# Patient Record
Sex: FEMALE | Race: WHITE | Employment: FULL TIME | URBAN - METROPOLITAN AREA
[De-identification: names, ages, dates, MRNs, and addresses within clinical notes are randomized per-mention and may not be internally consistent; named-entity substitution may affect disease eponyms.]

---

## 2017-02-08 LAB
HBSAG, EXTERNAL: NEGATIVE
RUBELLA, EXTERNAL: NORMAL
TYPE, ABO & RH, EXTERNAL: NORMAL

## 2017-07-12 LAB — T. PALLIDUM, EXTERNAL: NEGATIVE

## 2017-08-25 LAB — GRBS, EXTERNAL: NEGATIVE

## 2017-09-13 ENCOUNTER — ANESTHESIA EVENT (OUTPATIENT)
Dept: LABOR AND DELIVERY | Age: 32
End: 2017-09-13
Payer: COMMERCIAL

## 2017-09-13 ENCOUNTER — HOSPITAL ENCOUNTER (OUTPATIENT)
Dept: OTHER | Age: 32
Discharge: HOME | End: 2017-09-13
Payer: COMMERCIAL

## 2017-09-13 VITALS — WEIGHT: 144.1 LBS | BODY MASS INDEX: 24.6 KG/M2 | HEIGHT: 64 IN

## 2017-09-13 LAB
ABO + RH BLD: NORMAL
BLOOD GROUP ANTIBODIES SERPL: NORMAL
ERYTHROCYTE [DISTWIDTH] IN BLOOD BY AUTOMATED COUNT: 13 % (ref 11.5–14.5)
HCT VFR BLD AUTO: 35.8 % (ref 35–47)
HGB BLD-MCNC: 11.9 G/DL (ref 11.5–16)
MCH RBC QN AUTO: 29.8 PG (ref 26–34)
MCHC RBC AUTO-ENTMCNC: 33.2 G/DL (ref 30–36.5)
MCV RBC AUTO: 89.7 FL (ref 80–99)
PLATELET # BLD AUTO: 144 K/UL (ref 150–400)
RBC # BLD AUTO: 3.99 M/UL (ref 3.8–5.2)
SPECIMEN EXP DATE BLD: NORMAL
WBC # BLD AUTO: 6.4 K/UL (ref 3.6–11)

## 2017-09-13 PROCEDURE — 85027 COMPLETE CBC AUTOMATED: CPT | Performed by: OBSTETRICS & GYNECOLOGY

## 2017-09-13 PROCEDURE — 86900 BLOOD TYPING SEROLOGIC ABO: CPT | Performed by: OBSTETRICS & GYNECOLOGY

## 2017-09-13 PROCEDURE — 36415 COLL VENOUS BLD VENIPUNCTURE: CPT | Performed by: OBSTETRICS & GYNECOLOGY

## 2017-09-13 RX ORDER — GUAIFENESIN 100 MG/5ML
81 LIQUID (ML) ORAL DAILY
COMMUNITY
End: 2017-09-17

## 2017-09-13 NOTE — PROGRESS NOTES
Patient here for Pre-Admission Testing (PAT) for scheduled  section. PAT packet reviewed with the patient. Labs drawn and sent. CHRISTINE wipes and preventing surgical site infection education given to the patient. Education also provided to be NPO after 0400 and to arrive for scheduled procedure at 1000 on 2017. Patient verbalizes understanding and sent home with PAT packet for further review. Patient instructed to take no medication(s) on the morning of her scheduled procedure.

## 2017-09-14 ENCOUNTER — HOSPITAL ENCOUNTER (INPATIENT)
Age: 32
LOS: 3 days | Discharge: HOME | End: 2017-09-17
Attending: OBSTETRICS & GYNECOLOGY | Admitting: OBSTETRICS & GYNECOLOGY
Payer: COMMERCIAL

## 2017-09-14 ENCOUNTER — ANESTHESIA (OUTPATIENT)
Dept: LABOR AND DELIVERY | Age: 32
End: 2017-09-14
Payer: COMMERCIAL

## 2017-09-14 PROBLEM — Z34.90 PREGNANCY: Status: ACTIVE | Noted: 2017-09-14

## 2017-09-14 PROCEDURE — 77030034849

## 2017-09-14 PROCEDURE — 77030018836 HC SOL IRR NACL ICUM -A

## 2017-09-14 PROCEDURE — 74011250636 HC RX REV CODE- 250/636

## 2017-09-14 PROCEDURE — 75410000003 HC RECOV DEL/VAG/CSECN EA 0.5 HR: Performed by: OBSTETRICS & GYNECOLOGY

## 2017-09-14 PROCEDURE — 76010000392 HC C SECN EA ADDL 0.5 HR: Performed by: OBSTETRICS & GYNECOLOGY

## 2017-09-14 PROCEDURE — 77030011640 HC PAD GRND REM COVD -A

## 2017-09-14 PROCEDURE — 74011250636 HC RX REV CODE- 250/636: Performed by: ANESTHESIOLOGY

## 2017-09-14 PROCEDURE — 76060000078 HC EPIDURAL ANESTHESIA: Performed by: OBSTETRICS & GYNECOLOGY

## 2017-09-14 PROCEDURE — 65410000002 HC RM PRIVATE OB

## 2017-09-14 PROCEDURE — 74011250636 HC RX REV CODE- 250/636: Performed by: OBSTETRICS & GYNECOLOGY

## 2017-09-14 PROCEDURE — 76010000391 HC C SECN FIRST 1 HR: Performed by: OBSTETRICS & GYNECOLOGY

## 2017-09-14 PROCEDURE — 77030018846 HC SOL IRR STRL H20 ICUM -A

## 2017-09-14 PROCEDURE — 77030020061 HC IV BLD WRMR ADMIN SET 3M -B: Performed by: ANESTHESIOLOGY

## 2017-09-14 PROCEDURE — 74011000250 HC RX REV CODE- 250

## 2017-09-14 PROCEDURE — 77030011220 HC DEV ELECSURG COVD -B

## 2017-09-14 PROCEDURE — 77030032490 HC SLV COMPR SCD KNE COVD -B

## 2017-09-14 PROCEDURE — 4A1H74Z MONITORING OF PRODUCTS OF CONCEPTION, CARDIAC ELECTRICAL ACTIVITY, VIA NATURAL OR ARTIFICIAL OPENING: ICD-10-PCS | Performed by: OBSTETRICS & GYNECOLOGY

## 2017-09-14 PROCEDURE — 77030012890

## 2017-09-14 RX ORDER — MORPHINE SULFATE 10 MG/ML
10 INJECTION, SOLUTION INTRAMUSCULAR; INTRAVENOUS
Status: ACTIVE | OUTPATIENT
Start: 2017-09-14 | End: 2017-09-15

## 2017-09-14 RX ORDER — OXYCODONE AND ACETAMINOPHEN 5; 325 MG/1; MG/1
1 TABLET ORAL
Status: DISCONTINUED | OUTPATIENT
Start: 2017-09-14 | End: 2017-09-15

## 2017-09-14 RX ORDER — SODIUM CHLORIDE 0.9 % (FLUSH) 0.9 %
5-10 SYRINGE (ML) INJECTION EVERY 8 HOURS
Status: DISCONTINUED | OUTPATIENT
Start: 2017-09-14 | End: 2017-09-18 | Stop reason: HOSPADM

## 2017-09-14 RX ORDER — BUPIVACAINE HYDROCHLORIDE 7.5 MG/ML
INJECTION, SOLUTION EPIDURAL; RETROBULBAR AS NEEDED
Status: DISCONTINUED | OUTPATIENT
Start: 2017-09-14 | End: 2017-09-14 | Stop reason: HOSPADM

## 2017-09-14 RX ORDER — ONDANSETRON 2 MG/ML
4 INJECTION INTRAMUSCULAR; INTRAVENOUS
Status: ACTIVE | OUTPATIENT
Start: 2017-09-14 | End: 2017-09-15

## 2017-09-14 RX ORDER — NALOXONE HYDROCHLORIDE 0.4 MG/ML
0.4 INJECTION, SOLUTION INTRAMUSCULAR; INTRAVENOUS; SUBCUTANEOUS AS NEEDED
Status: DISCONTINUED | OUTPATIENT
Start: 2017-09-14 | End: 2017-09-18 | Stop reason: HOSPADM

## 2017-09-14 RX ORDER — PHENYLEPHRINE 10 MG/250 ML(40 MCG/ML)IN 0.9 % SOD.CHLORIDE INTRAVENOUS
Status: DISPENSED
Start: 2017-09-14 | End: 2017-09-14

## 2017-09-14 RX ORDER — IBUPROFEN 400 MG/1
800 TABLET ORAL EVERY 8 HOURS
Status: DISCONTINUED | OUTPATIENT
Start: 2017-09-14 | End: 2017-09-18 | Stop reason: HOSPADM

## 2017-09-14 RX ORDER — MIDAZOLAM HYDROCHLORIDE 1 MG/ML
INJECTION, SOLUTION INTRAMUSCULAR; INTRAVENOUS AS NEEDED
Status: DISCONTINUED | OUTPATIENT
Start: 2017-09-14 | End: 2017-09-14 | Stop reason: HOSPADM

## 2017-09-14 RX ORDER — OXYTOCIN/RINGER'S LACTATE 20/1000 ML
PLASTIC BAG, INJECTION (ML) INTRAVENOUS
Status: DISCONTINUED | OUTPATIENT
Start: 2017-09-14 | End: 2017-09-14 | Stop reason: HOSPADM

## 2017-09-14 RX ORDER — OXYTOCIN/RINGER'S LACTATE 20/1000 ML
PLASTIC BAG, INJECTION (ML) INTRAVENOUS
Status: DISPENSED
Start: 2017-09-14 | End: 2017-09-14

## 2017-09-14 RX ORDER — MORPHINE SULFATE 2 MG/ML
INJECTION, SOLUTION INTRAMUSCULAR; INTRAVENOUS AS NEEDED
Status: DISCONTINUED | OUTPATIENT
Start: 2017-09-14 | End: 2017-09-14 | Stop reason: HOSPADM

## 2017-09-14 RX ORDER — DIPHENHYDRAMINE HYDROCHLORIDE 50 MG/ML
12.5 INJECTION, SOLUTION INTRAMUSCULAR; INTRAVENOUS
Status: DISCONTINUED | OUTPATIENT
Start: 2017-09-14 | End: 2017-09-18 | Stop reason: HOSPADM

## 2017-09-14 RX ORDER — SODIUM CHLORIDE 0.9 % (FLUSH) 0.9 %
5-10 SYRINGE (ML) INJECTION AS NEEDED
Status: DISCONTINUED | OUTPATIENT
Start: 2017-09-14 | End: 2017-09-14 | Stop reason: HOSPADM

## 2017-09-14 RX ORDER — MORPHINE SULFATE 10 MG/ML
6 INJECTION, SOLUTION INTRAMUSCULAR; INTRAVENOUS
Status: ACTIVE | OUTPATIENT
Start: 2017-09-14 | End: 2017-09-15

## 2017-09-14 RX ORDER — SIMETHICONE 80 MG
80 TABLET,CHEWABLE ORAL AS NEEDED
Status: DISCONTINUED | OUTPATIENT
Start: 2017-09-14 | End: 2017-09-18 | Stop reason: HOSPADM

## 2017-09-14 RX ORDER — BISACODYL 5 MG
5 TABLET, DELAYED RELEASE (ENTERIC COATED) ORAL DAILY PRN
Status: DISCONTINUED | OUTPATIENT
Start: 2017-09-14 | End: 2017-09-18 | Stop reason: HOSPADM

## 2017-09-14 RX ORDER — SODIUM CHLORIDE, SODIUM LACTATE, POTASSIUM CHLORIDE, CALCIUM CHLORIDE 600; 310; 30; 20 MG/100ML; MG/100ML; MG/100ML; MG/100ML
1000 INJECTION, SOLUTION INTRAVENOUS CONTINUOUS
Status: DISCONTINUED | OUTPATIENT
Start: 2017-09-14 | End: 2017-09-14 | Stop reason: HOSPADM

## 2017-09-14 RX ORDER — SODIUM CHLORIDE, SODIUM LACTATE, POTASSIUM CHLORIDE, CALCIUM CHLORIDE 600; 310; 30; 20 MG/100ML; MG/100ML; MG/100ML; MG/100ML
125 INJECTION, SOLUTION INTRAVENOUS CONTINUOUS
Status: DISCONTINUED | OUTPATIENT
Start: 2017-09-14 | End: 2017-09-17

## 2017-09-14 RX ORDER — KETOROLAC TROMETHAMINE 30 MG/ML
30 INJECTION, SOLUTION INTRAMUSCULAR; INTRAVENOUS
Status: DISPENSED | OUTPATIENT
Start: 2017-09-14 | End: 2017-09-15

## 2017-09-14 RX ORDER — SODIUM CHLORIDE 0.9 % (FLUSH) 0.9 %
5-10 SYRINGE (ML) INJECTION EVERY 8 HOURS
Status: DISCONTINUED | OUTPATIENT
Start: 2017-09-14 | End: 2017-09-14 | Stop reason: HOSPADM

## 2017-09-14 RX ORDER — NALBUPHINE HYDROCHLORIDE 10 MG/ML
2.5 INJECTION, SOLUTION INTRAMUSCULAR; INTRAVENOUS; SUBCUTANEOUS
Status: ACTIVE | OUTPATIENT
Start: 2017-09-14 | End: 2017-09-15

## 2017-09-14 RX ORDER — SODIUM CHLORIDE 0.9 % (FLUSH) 0.9 %
5-10 SYRINGE (ML) INJECTION AS NEEDED
Status: DISCONTINUED | OUTPATIENT
Start: 2017-09-14 | End: 2017-09-18 | Stop reason: HOSPADM

## 2017-09-14 RX ORDER — NALOXONE HYDROCHLORIDE 0.4 MG/ML
0.4 INJECTION, SOLUTION INTRAMUSCULAR; INTRAVENOUS; SUBCUTANEOUS AS NEEDED
Status: CANCELLED | OUTPATIENT
Start: 2017-09-14

## 2017-09-14 RX ORDER — MORPHINE SULFATE 0.5 MG/ML
INJECTION, SOLUTION EPIDURAL; INTRATHECAL; INTRAVENOUS AS NEEDED
Status: DISCONTINUED | OUTPATIENT
Start: 2017-09-14 | End: 2017-09-14 | Stop reason: HOSPADM

## 2017-09-14 RX ORDER — GLYCOPYRROLATE 0.2 MG/ML
INJECTION INTRAMUSCULAR; INTRAVENOUS AS NEEDED
Status: DISCONTINUED | OUTPATIENT
Start: 2017-09-14 | End: 2017-09-14 | Stop reason: HOSPADM

## 2017-09-14 RX ORDER — CEFAZOLIN SODIUM IN 0.9 % NACL 2 G/50 ML
2 INTRAVENOUS SOLUTION, PIGGYBACK (ML) INTRAVENOUS ONCE
Status: COMPLETED | OUTPATIENT
Start: 2017-09-14 | End: 2017-09-14

## 2017-09-14 RX ORDER — ACETAMINOPHEN 10 MG/ML
INJECTION, SOLUTION INTRAVENOUS AS NEEDED
Status: DISCONTINUED | OUTPATIENT
Start: 2017-09-14 | End: 2017-09-14 | Stop reason: HOSPADM

## 2017-09-14 RX ORDER — ACETAMINOPHEN 325 MG/1
650 TABLET ORAL
Status: DISCONTINUED | OUTPATIENT
Start: 2017-09-14 | End: 2017-09-18 | Stop reason: HOSPADM

## 2017-09-14 RX ORDER — OXYTOCIN/RINGER'S LACTATE 20/1000 ML
125-500 PLASTIC BAG, INJECTION (ML) INTRAVENOUS ONCE
Status: ACTIVE | OUTPATIENT
Start: 2017-09-14 | End: 2017-09-15

## 2017-09-14 RX ADMIN — SODIUM CHLORIDE, SODIUM LACTATE, POTASSIUM CHLORIDE, AND CALCIUM CHLORIDE: 600; 310; 30; 20 INJECTION, SOLUTION INTRAVENOUS at 12:30

## 2017-09-14 RX ADMIN — BUPIVACAINE HYDROCHLORIDE 12 MG: 7.5 INJECTION, SOLUTION EPIDURAL; RETROBULBAR at 12:23

## 2017-09-14 RX ADMIN — ACETAMINOPHEN 1000 MG: 10 INJECTION, SOLUTION INTRAVENOUS at 13:37

## 2017-09-14 RX ADMIN — MIDAZOLAM HYDROCHLORIDE 1 MG: 1 INJECTION, SOLUTION INTRAMUSCULAR; INTRAVENOUS at 13:04

## 2017-09-14 RX ADMIN — MIDAZOLAM HYDROCHLORIDE 1 MG: 1 INJECTION, SOLUTION INTRAMUSCULAR; INTRAVENOUS at 13:00

## 2017-09-14 RX ADMIN — MORPHINE SULFATE 200 MCG: 0.5 INJECTION, SOLUTION EPIDURAL; INTRATHECAL; INTRAVENOUS at 12:23

## 2017-09-14 RX ADMIN — CEFAZOLIN 2 G: 1 INJECTION, POWDER, FOR SOLUTION INTRAMUSCULAR; INTRAVENOUS; PARENTERAL at 12:05

## 2017-09-14 RX ADMIN — SODIUM CHLORIDE, SODIUM LACTATE, POTASSIUM CHLORIDE, AND CALCIUM CHLORIDE 1000 ML: 600; 310; 30; 20 INJECTION, SOLUTION INTRAVENOUS at 11:09

## 2017-09-14 RX ADMIN — MORPHINE SULFATE 0.3 MG: 2 INJECTION, SOLUTION INTRAMUSCULAR; INTRAVENOUS at 13:00

## 2017-09-14 RX ADMIN — Medication 10 ML: at 23:35

## 2017-09-14 RX ADMIN — KETOROLAC TROMETHAMINE 30 MG: 30 INJECTION, SOLUTION INTRAMUSCULAR at 23:34

## 2017-09-14 RX ADMIN — GLYCOPYRROLATE 0.2 MG: 0.2 INJECTION INTRAMUSCULAR; INTRAVENOUS at 12:35

## 2017-09-14 RX ADMIN — Medication: at 12:51

## 2017-09-14 RX ADMIN — KETOROLAC TROMETHAMINE 30 MG: 30 INJECTION, SOLUTION INTRAMUSCULAR at 15:55

## 2017-09-14 RX ADMIN — SODIUM CHLORIDE, SODIUM LACTATE, POTASSIUM CHLORIDE, AND CALCIUM CHLORIDE 125 ML/HR: 600; 310; 30; 20 INJECTION, SOLUTION INTRAVENOUS at 19:25

## 2017-09-14 NOTE — IP AVS SNAPSHOT
[address] 
[phone] Patient: Grant Hutson MRN: [id number] MOS:8/4/9021 Current Discharge Medication List  
  
CONTINUE these medications which have CHANGED Dose & Instructions Dispensing Information Comments Morning Noon Evening Bedtime  
 ibuprofen 800 mg tablet Commonly known as:  MOTRIN What changed:   
- medication strength 
- how much to take - when to take this 
- reasons to take this Your last dose was: Your next dose is:    
   
   
 Dose:  800 mg Take 1 Tab by mouth every eight (8) hours. Quantity:  30 Tab Refills:  0  
     
   
   
   
  
 oxyCODONE-acetaminophen 5-325 mg per tablet Commonly known as:  PERCOCET What changed:   
- how much to take 
- reasons to take this Your last dose was: Your next dose is:    
   
   
 Dose:  1-2 Tab Take 1-2 Tabs by mouth every four (4) hours as needed. Max Daily Amount: 12 Tabs. Quantity:  30 Tab Refills:  0 CONTINUE these medications which have NOT CHANGED Dose & Instructions Dispensing Information Comments Morning Noon Evening Bedtime PRENATAL DHA+COMPLETE PRENATAL -300 mg-mcg-mg Cmpk Generic drug:  [id number] nam-folic-dha Your last dose was: Your next dose is: Take  by mouth. Indications: PREGNANCY Refills:  0 STOP taking these medications   
 aspirin 81 mg chewable tablet Where to Get Your Medications Information on where to get these meds will be given to you by the nurse or doctor. ! Ask your nurse or doctor about these medications  
  ibuprofen 800 mg tablet  
 oxyCODONE-acetaminophen 5-325 mg per tablet

## 2017-09-14 NOTE — ANESTHESIA PROCEDURE NOTES
Spinal Block    Performed by: Gracie Campbell  Authorized by: Gracie Campbell     Pre-procedure:   Indications: at surgeon's request, post-op pain management and primary anesthetic  Preanesthetic Checklist: patient identified, risks and benefits discussed, anesthesia consent, site marked, patient being monitored and timeout performed      Spinal Block:   Patient Position:  Seated  Prep Region:  Lumbar  Prep: Betadine      Location:  L3-4  Technique:  Single shot  Local:  Lidocaine 1%      Needle:     Needle Gauge:  25 G  Attempts:  1      Events: CSF confirmed, no blood with aspiration and no paresthesia        Assessment:  Insertion:  Uncomplicated  Patient tolerance:  Patient tolerated the procedure well with no immediate complications

## 2017-09-14 NOTE — L&D DELIVERY NOTE
Delivery Summary    Patient: Mary Martinez MRN: [id number]  SSN: [id number]    YOB: 1985  Age: 34 y.o. Sex: female        Labor Events:    Labor: No    Rupture Date:      Rupture Time:      Rupture Type:      Amniotic Fluid Volume:      Amniotic Fluid Description:  Amniotic fluid Odor:            Induction:          Induction Date:        Induction Time:       Indications for Induction:       Augmentation:      Augmentation Date:      Augmentation Time:      Indications for Augmentation:      Events:       Cervical Ripening:       None    Rupture Identifier: Rupture 1     Labor complications: Additional complications:         Delivery Events:  Estimated Blood Loss (ml): 600      Information for the patient's :  Sabine Melton [[id number]]     Delivery Summary - Baby    Delivery Date: 2017  Delivery Time: 12:48 PM  Delivery Type: , Low Transverse    Section Delivery:     Sex:  male     Gestational Age: 37w0d  Delivery Clinician:  BRIAN QUIROS   Living?: Living  Delivery Location: L&D           APGARS  One minute Five minutes Ten minutes   Skin color: 2   2        Heart rate: 2   2        Grimace: 2   2        Muscle tone: 2   2        Breathin   2        Totals: 9   10           Presentation: Vertex    Position:        Resuscitation Method:  Tactile Stimulation;Suctioning-bulb     Meconium Stained: None      Cord Information: 3 Vessels   Complications: None  Cord Blood Sent?:  No    Blood Gases Sent?:  No    Placenta:  Date/Time:  12:49 PM  Removal: Expressed      Appearance: Normal;Intact     [place] Measurements:  Birth Weight:        Birth Length:        Head Circumference:        Chest Circumference:       Abdominal Girth: Other Providers:   BRIAN QUIROS;THEA MEANS;RAMANDEEP STERLING;OLINDA ABDI;INDERJIT CARLOS;VINNY MALONE Obstetrician;Primary Nurse;Primary  Nurse; Anesthesiologist;Crna;Staff Nurse Group Beta Strep:   Lab Results   Component Value Date/Time    Jack, External negative 2017        Cord Blood Results:  Information for the patient's :  Karlene Mijares [[id number]]   No results found for: ABORH, PCTABR, PCTDIG, BILI, ABORHEXT, ABORH    Information for the patient's :  Karlene Mijares [[id number]]   No results found for: APH, APCO2, APO2, AHCO3, ABEC, ABDC, O2ST, SITE, RSCOM, PHI, PCO2I, PO2I, HCO3I, SO2I, IBD    Information for the patient's :  Karlene Mijares [[id number]]   No results found for: EPHV, PCO2V, PO2V, HCO3V, O2STV, EBDV

## 2017-09-14 NOTE — IP AVS SNAPSHOT
[address] 
[phone] Patient: Get Caicedo MRN: [id number] FBF:4/2/[birth year]138 You are allergic to the following No active allergies Recent Documentation Breastfeeding? OB Status Smoking Status Yes Recent pregnancy Never Smoker Emergency Contacts Name Discharge Info Relation Home Work Mobile [address] Tessa Moreno CAREGIVER [3] Spouse [3] [phone] About your hospitalization You were admitted on:  September 14, 2017 You last received care in the:  [facility] You were discharged on:  September 17, 2017 Unit phone number:  [phone] Why you were hospitalized Your primary diagnosis was:  Not on File Your diagnoses also included:  Pregnancy Providers Seen During Your Hospitalizations Provider Role Specialty Primary office phone Anil Acosta MD Attending Provider Obstetrics & Gynecology [phone] Your Primary Care Physician (PCP) Primary Care Physician Office Phone Office Fax 50 [facility], [address] [place] [address] [phone] Follow-up Information Follow up With Details Comments Contact Info [address] Call As needed with breastfeeding [address] [facility], [address] [place] [address] 
[phone] Anil Acosta MD In 6 weeks  [address] [facility] [address] [facility]  [address] [phone] Current Discharge Medication List  
  
CONTINUE these medications which have CHANGED Dose & Instructions Dispensing Information Comments Morning Noon Evening Bedtime  
 ibuprofen 800 mg tablet Commonly known as:  MOTRIN What changed:   
- medication strength 
- how much to take - when to take this 
- reasons to take this Your last dose was:     
   
Your next dose is:    
   
   
 Dose:  800 mg  
 Take 1 Tab by mouth every eight (8) hours. Quantity:  30 Tab Refills:  0  
     
   
   
   
  
 oxyCODONE-acetaminophen 5-325 mg per tablet Commonly known as:  PERCOCET What changed:   
- how much to take 
- reasons to take this Your last dose was: Your next dose is:    
   
   
 Dose:  1-2 Tab Take 1-2 Tabs by mouth every four (4) hours as needed. Max Daily Amount: 12 Tabs. Quantity:  30 Tab Refills:  0 CONTINUE these medications which have NOT CHANGED Dose & Instructions Dispensing Information Comments Morning Noon Evening Bedtime PRENATAL DHA+COMPLETE PRENATAL -300 mg-mcg-mg Cmpk Generic drug:  [id number] nam-folic-dha Your last dose was: Your next dose is: Take  by mouth. Indications: PREGNANCY Refills:  0 STOP taking these medications   
 aspirin 81 mg chewable tablet Where to Get Your Medications Information on where to get these meds will be given to you by the nurse or doctor. ! Ask your nurse or doctor about these medications  
  ibuprofen 800 mg tablet  
 oxyCODONE-acetaminophen 5-325 mg per tablet Discharge Instructions Remove bandage in one week. Milk of magnesia for BMs  Section: What to Expect at [facility] Your Recovery A  section, or , is surgery to deliver your baby through a cut, called an incision, that the doctor makes in your lower belly and uterus. You may have some pain in your lower belly and need pain medicine for 1 to 2 weeks. You can expect some vaginal bleeding for several weeks. You will probably need about 6 weeks to fully recover. It is important to take it easy while the incision is healing. Avoid heavy lifting, strenuous activities, or exercises that strain the belly muscles while you are recovering.  Ask a family member or friend for help with housework, cooking, and shopping. This care sheet gives you a general idea about how long it will take for you to recover. But each person recovers at a different pace. Follow the steps below to get better as quickly as possible. How can you care for yourself at home? Activity · Rest when you feel tired. Getting enough sleep will help you recover. · Try to walk each day. Start by walking a little more than you did the day before. Bit by bit, increase the amount you walk. Walking boosts blood flow and helps prevent pneumonia, constipation, and blood clots. · Avoid strenuous activities, such as bicycle riding, jogging, weightlifting, and aerobic exercise, for 6 weeks or until your doctor says it is okay. · Until your doctor says it is okay, do not lift anything heavier than your baby. · Do not do sit-ups or other exercises that strain the belly muscles for 6 weeks or until your doctor says it is okay. · Hold a pillow over your incision when you cough or take deep breaths. This will support your belly and decrease your pain. · You may shower as usual. Pat the incision dry when you are done. · You will have some vaginal bleeding. Wear sanitary pads. Do not douche or use tampons until your doctor says it is okay. · Ask your doctor when you can drive again. · You will probably need to take at least 6 weeks off work. It depends on the type of work you do and how you feel. · Ask your doctor when it is okay for you to have sex. Diet · You can eat your normal diet. If your stomach is upset, try bland, low-fat foods like plain rice, broiled chicken, toast, and yogurt. · Drink plenty of fluids (unless your doctor tells you not to). · You may notice that your bowel movements are not regular right after your surgery. This is common. Try to avoid constipation and straining with bowel movements. You may want to take a fiber supplement every day.  If you have not had a bowel movement after a couple of days, ask your doctor about taking a mild laxative. · If you are breastfeeding, do not drink any alcohol. Medicines · Your doctor will tell you if and when you can restart your medicines. He or she will also give you instructions about taking any new medicines. · If you take blood thinners, such as warfarin (Coumadin), clopidogrel (Plavix), or aspirin, be sure to talk to your doctor. He or she will tell you if and when to start taking those medicines again. Make sure that you understand exactly what your doctor wants you to do. · Take pain medicines exactly as directed. ¨ If the doctor gave you a prescription medicine for pain, take it as prescribed. ¨ If you are not taking a prescription pain medicine, ask your doctor if you can take an over-the-counter medicine. · If you think your pain medicine is making you sick to your stomach: 
¨ Take your medicine after meals (unless your doctor has told you not to). ¨ Ask your doctor for a different pain medicine. · If your doctor prescribed antibiotics, take them as directed. Do not stop taking them just because you feel better. You need to take the full course of antibiotics. Incision care · If you have strips of tape on the incision, leave the tape on for a week or until it falls off. · Wash the area daily with warm, soapy water, and pat it dry. Don't use hydrogen peroxide or alcohol, which can slow healing. You may cover the area with a gauze bandage if it weeps or rubs against clothing. Change the bandage every day. · Keep the area clean and dry. Other instructions · If you breastfeed your baby, you may be more comfortable while you are healing if you place the baby so that he or she is not resting on your belly. Try tucking your baby under your arm, with his or her body along the side you will be feeding on.  Support your baby's upper body with your arm. With that hand you can control your baby's head to bring his or her mouth to your breast. This is sometimes called the football hold. Follow-up care is a key part of your treatment and safety. Be sure to make and go to all appointments, and call your doctor if you are having problems. It's also a good idea to know your test results and keep a list of the medicines you take. When should you call for help? Call 911 anytime you think you may need emergency care. For example, call if: 
· You passed out (lost consciousness). · You have symptoms of a blood clot in your lung (called a pulmonary embolism). These may include: 
¨ Sudden chest pain. ¨ Trouble breathing. ¨ Coughing up blood. · You have thoughts of harming yourself, your baby, or another person. Call your doctor now or seek immediate medical care if: 
· You have severe vaginal bleeding. This means that you are soaking through a pad every hour for 2 or more hours. · You are dizzy or lightheaded, or you feel like you may faint. · You have new or more belly pain. · You have loose stitches, or your incision comes open. · You have symptoms of infection, such as: 
¨ Increased pain, swelling, warmth, or redness. ¨ Red streaks leading from the incision. ¨ Pus draining from the incision. ¨ A fever. · You have symptoms of a blood clot in your leg (called a deep vein thrombosis), such as: 
¨ Pain in your calf, back of the knee, thigh, or groin. ¨ Redness and swelling in your leg or groin. Watch closely for changes in your health, and be sure to contact your doctor if: 
· You feel sad, anxious, or hopeless for more than a few days. · You do not get better as expected. Where can you learn more? Go to http://tyrell-luis.info/. Enter M806 in the search box to learn more about \" Section: What to Expect at Home. \" Current as of: 2017 Content Version: 11.3 © 3260-2137 [employer]wise, [employer]orporated. Care instructions adapted under license by [employer] (which disclaims liability or warranty for this information). If you have questions about a medical condition or this instruction, always ask your healthcare professional. [address] any warranty or liability for your use of this information. Discharge Orders None Panono Announcement We are excited to announce that we are making your provider's discharge notes available to you in Panono. You will see these notes when they are completed and signed by the physician that discharged you from your recent hospital stay. If you have any questions or concerns about any information you see in Panono, please call the Health Information Department where you were seen or reach out to your Primary Care Provider for more information about your plan of care. Introducing [facility]! Olga Kinsey introduces Panono patient portal. Now you can access parts of your medical record, email your doctor's office, and request medication refills online. 1. In your internet browser, go to [email or web address] 2. Click on the First Time User? Click Here link in the Sign In box. You will see the New Member Sign Up page. 3. Enter your Panono Access Code exactly as it appears below. You will not need to use this code after youve completed the sign-up process. If you do not sign up before the expiration date, you must request a new code. · Panono Access Code: [email or web address] Expires: 12/16/2017 11:39 AM 
 
4. Enter the last four digits of your Social Security Number (xxxx) and Date of Birth (mm/dd/yyyy) as indicated and click Submit. You will be taken to the next sign-up page. 5. Create a Panono ID. This will be your Panono login ID and cannot be changed, so think of one that is secure and easy to remember. 6. Create a Qnovo password. You can change your password at any time. 7. Enter your Password Reset Question and Answer. This can be used at a later time if you forget your password. 8. Enter your e-mail address. You will receive e-mail notification when new information is available in [address] 9. Click Sign Up. You can now view and download portions of your medical record. 10. Click the Download Summary menu link to download a portable copy of your medical information. If you have questions, please visit the Frequently Asked Questions section of the Qnovo website. Remember, Qnovo is NOT to be used for urgent needs. For medical emergencies, dial 911. Now available from your iPhone and Android! General Information Please provide this summary of care documentation to your next provider. Patient Signature:  ____________________________________________________________ Date:  ____________________________________________________________  
  
Jesús Garcia Provider Signature:  ____________________________________________________________ Date:  ____________________________________________________________

## 2017-09-14 NOTE — ANESTHESIA PREPROCEDURE EVALUATION
Anesthetic History   No history of anesthetic complications            Review of Systems / Medical History  Patient summary reviewed, nursing notes reviewed and pertinent labs reviewed    Pulmonary  Within defined limits                 Neuro/Psych   Within defined limits           Cardiovascular  Within defined limits                Exercise tolerance: >4 METS     GI/Hepatic/Renal  Within defined limits              Endo/Other  Within defined limits           Other Findings              Physical Exam    Airway  Mallampati: II  TM Distance: 4 - 6 cm  Neck ROM: normal range of motion   Mouth opening: Normal     Cardiovascular  Regular rate and rhythm,  S1 and S2 normal,  no murmur, click, rub, or gallop             Dental  No notable dental hx       Pulmonary  Breath sounds clear to auscultation               Abdominal  GI exam deferred       Other Findings            Anesthetic Plan    ASA: 2  Anesthesia type: spinal            Anesthetic plan and risks discussed with: Patient      Fetal distress, abruption

## 2017-09-14 NOTE — ANESTHESIA POSTPROCEDURE EVALUATION
Post-Anesthesia Evaluation and Assessment    Patient: Yesica Molina MRN: [id number]  SSN: [id number]    YOB: 1985  Age: 34 y.o. Sex: female       Cardiovascular Function/Vital Signs  Visit Vitals    BP 95/56    Pulse (!) 46    Temp 36.5 °C (97.7 °F)    Resp 18    SpO2 98%    Breastfeeding Yes       Patient is status post spinal anesthesia for Procedure(s):   SECTION. Nausea/Vomiting: None    Postoperative hydration reviewed and adequate. Pain:  Pain Scale 1: Numeric (0 - 10) (17 1440)  Pain Intensity 1: 0 (17 1440)   Managed    Neurological Status: At baseline    Mental Status and Level of Consciousness: Arousable    Pulmonary Status:   O2 Device: Room air (17 1355)   Adequate oxygenation and airway patent    Complications related to anesthesia: None    Post-anesthesia assessment completed.  No concerns    Signed By: Myrl Schwab, DO     2017

## 2017-09-14 NOTE — PROGRESS NOTES
TRANSFER - IN REPORT:    Verbal report received from [address] [facility] Jesus RN(name) on Jyoti Zimmer  being received from L&D(unit) for routine progression of care      Report consisted of patients Situation, Background, Assessment and   Recommendations(SBAR). Information from the following report(s) SBAR was reviewed with the receiving nurse. Opportunity for questions and clarification was provided. Assessment completed upon patients arrival to unit and care assumed. 1627: Pt has hypotension, per L&D this is pt baseline. Pt is asymptomatic.

## 2017-09-14 NOTE — H&P
EDC:2017  EGA: 39 weeks, 1 days      Primary Provider:  Laura Dempsey MD      History of Present Illness: The patient is a  at 44 2/7 weeks for induction of labor. The patient has no complaints today. She reports active fetal movement. She has sufferred with severe vertigo and hypotension during this pregnancy that has resulted in her not being able to drive. She has venkata favorable cervix. Risks and benefits of induction were discussed in the office and she has requested induction. Patient's Prenatal Care with Doctor of Record Kishan Vasquez MD Notable For -    Faint  Normal pregnancy multigravida  UTI pregnant GROVER neg   lab screening  ANXIETY, DEPRESSION-NO MEDS  GOITER, MULTINODULAR          Impression & Recommendations:    Problem # 1:  Normal pregnancy multigravida (ICD-V22.1) (TPE45-J63.76)  term iup complicated with severe vertigo/dizziness  favorable cervix  IOL  group b streptococcus negative  pitocin until arom then will allow ambulation          Past Medical History:     Reviewed history from 2017 and no changes required:        Anxiety- no meds in 6 mos, doing well        Depression - no meds in 6 mos, doing well - reports \"mild disease\" and doing much better now        Polyps on thyroid        fever blisters    Past Surgical History:     Reviewed history from 2014 and no changes required:        [place] Teeth        VAVD male 13 Yobany Phlegm     Family History Summary:      Reviewed history Last on 2017 and no changes required:2017      General Comments - FH:  Family history transferred to [address] [place] [address] And [facility]-a/w    Social History:     Reviewed history from 2017 and no changes required:                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       Previous Tobacco Use: Signed On - 2017  Smoked Tobacco Use:  Never smoker  Smokeless Tobacco Use:  Never     Counseled to quit/cut down:  yes  Passive smoke exposure:  no  Drug use:  no  HIV high-risk behavior:  no  Caffeine use:  1-2 cups drinks per day    Previous Alcohol Use: Signed On - 01/30/2017  Alcohol use:  no  Exercise:  yes     Times per week:  7     Type of Exercise:  walking, running, etc  Seatbelt use:  100 %  Sun Exposure:  frequently    PAP Smear History:     Date of Last PAP Smear:  03/10/2016      Review of Systems        See HPI    Except as noted in the HPI, the review of systems is negative for General, Breast, , CV, Resp, GI, Endo, MS, Derm, Neuro, Psych, Eyes, ENT, Allergy and Heme.     Allergies      Medications Removed from Medication List        Flowsheet View for Follow-up Visit     Estimated weeks of        gestation:  39 1/7     Fetal position:  vertex     Cx Dilation:  3     Cx Effacement: 30%     Cx Station:  -3          Physical Exam     General           General appearance:  no acute distress    Psych           Orientation:  oriented to time, place, and person          Mood:  no appearance of anxiety, depression, or agitation    Abdomen           Abdomen:  gravid nontender    Pelvic Exam           EGBUS:  no lesions                  Dilation: : 3                  Effacement:  30%                  Station:  -3                  Presentation:  vertex            Impression & Recommendations:    Problem # 1:  Normal pregnancy multigravida (ICD-V22.1) (ROV57-S73.50)  term iup complicated with severe vertigo/dizziness  favorable cervix  IOL  group b streptococcus negative  pitocin until arom then will allow ambulation      Medications (at conclusion of this visit)    01/30/2017 PNV-DHA CAPS (PRENAT W/O Y-EX-BIBOWQP-FA-DHA CAPS) Prescribed by non 606/706 Hector Riley MD          LABORATORY DATA   TEST DATE RESULT   Group B Strep culture 08/23/2017 Negative                                   (Group B Strep Culture Result Field)   Blood Type 01/30/2017 O (Blood Type Result Field)   Rh 01/30/2017 Positive                                   (Rh Result Field)   Rhogam Inj Given 06/13/2013 *   Tdap Vaccine Given 07/05/2017 Vacc. [facility]   Antibody Screen 07/05/2017 Negative   Rubella  [facility] Reference Ranges On or After 3/10/14                  <0.90              Non-immune      0.90 - 0.99     Equivocal      >0.99              Immune    [facility] Reference Ranges  Before 3/10/14           <5                 Non-immune             5 - 9               Equivocal            >9                 Immune  [facility] Reference Ranges       < Or = 0.90       Negative             0.91-1.09          Equivocal            > Or = 1.10       Positive   01/30/2017     2.60     TPA (T Pallidum Antibodies) 07/05/2017 Negative   Serology (RPR) 06/13/2013 *   HBsAg 01/30/2017 Negative   HIV 01/30/2017 Non Reactive   Hemoglobin 08/09/2017 11.4   Hematocrit 08/09/2017 35.2   Platelets 02/57/6528 212 X10E3/UL   TSH 01/30/2017 1.710   Urine Culture 03/14/2017 Negative   GC DNA Probe 01/30/2017 Negative   Chlamydia DNA 01/30/2017 Negative   PAP 03/10/2016 NIL   Flu Vaccine Given 01/30/2017 vacc.  elsewhere   HGBA1C 06/13/2013 *   HGB Electro     T4, Free 05/08/2014 1.14   BG Fasting 06/13/2013 *   GTT 1H 50G 07/05/2017 54   GTT 1H 100G 06/13/2013 *   GTT 2H 100G 06/13/2013 *   GTT 3H 100G 06/13/2013 *   Glucose Plasma 06/13/2013 *   CF Accept or Decline 01/30/2017 declined   CF Screen Result 01/07/2013 Negative   Nuchal Trans 05/04/2017 4.7^4.7 mm&millimeters   AFP Only 04/04/2017 *Screen Negative*   Tetra     AFP Serum 06/13/2013 *   [facility] 12/12/2012 declined   AFP Amniotic 06/13/2013 *   Amnio Karyo 06/13/2013 *   FISH 06/13/2013 *   GC Culture 06/13/2013 *   Chlamydia Cult 06/13/2013 *   Ureaplasma     Mycoplasma     WBC 08/09/2017 11.0 X10E3/UL   RBC 08/09/2017 3.60 X10E6/UL   MCV 08/09/2017 98   MCH 08/09/2017 31.7   MCHC RBC 08/09/2017 32.4     ULTRASOUND DATA   TEST DATE RESULT   Estimated Fetal Weight 05/04/2017 154.46272829^547 g&grams                                     Weight % 05/04/2017 44^44% %&percent                                                TAWANDA                      BPP     Cervical Length (mm)             Electronically signed by Krissy Wilson MD on 09/13/2017 at 5:50 PM    ________________________________________________________________________

## 2017-09-14 NOTE — ROUTINE PROCESS
Bedside and Verbal shift change report given to NANCI Valdez RN (oncoming nurse) by Dot Soria. Kiya Storey RN (offgoing nurse). Report included the following information SBAR, Kardex, MAR and Accordion. 0100 hrs-Kidd discontinued. Pt instructed to call out for assistance to the bathroom. Pt verbalized that she understood.

## 2017-09-14 NOTE — PROGRESS NOTES
1010 Pt arrived for scheduled repeat  section    13:38- baby delivered via csection    . TRANSFER - OUT REPORT:    Verbal report given to INGRID Nicole RN(name) on Prasad Diaz  being transferred to Mother Infant(unit) for routine progression of care       Report consisted of patients Situation, Background, Assessment and   Recommendations(SBAR). Information from the following report(s) SBAR, Kardex, Intake/Output, MAR and Accordion was reviewed with the receiving nurse. Lines:   Peripheral IV 17 Left Arm (Active)   Site Assessment Clean, dry, & intact 2017 12:42 PM   Phlebitis Assessment 0 2017 12:42 PM   Infiltration Assessment 0 2017 12:42 PM   Dressing Status Clean, dry, & intact 2017 12:42 PM   Dressing Type Transparent;Tape 2017 12:42 PM   Hub Color/Line Status Pink; Infusing 2017 12:42 PM        Opportunity for questions and clarification was provided.       Patient transported with:   Registered Nurse

## 2017-09-15 LAB
BASOPHILS # BLD: 0 K/UL (ref 0–0.1)
BASOPHILS NFR BLD: 0 % (ref 0–1)
DIFFERENTIAL METHOD BLD: ABNORMAL
EOSINOPHIL # BLD: 0 K/UL (ref 0–0.4)
EOSINOPHIL NFR BLD: 0 % (ref 0–7)
ERYTHROCYTE [DISTWIDTH] IN BLOOD BY AUTOMATED COUNT: 13.1 % (ref 11.5–14.5)
HCT VFR BLD AUTO: 32.5 % (ref 35–47)
HGB BLD-MCNC: 10.9 G/DL (ref 11.5–16)
LYMPHOCYTES # BLD: 0.5 K/UL (ref 0.8–3.5)
LYMPHOCYTES NFR BLD: 4 % (ref 12–49)
MCH RBC QN AUTO: 30.2 PG (ref 26–34)
MCHC RBC AUTO-ENTMCNC: 33.5 G/DL (ref 30–36.5)
MCV RBC AUTO: 90 FL (ref 80–99)
MONOCYTES # BLD: 0.3 K/UL (ref 0–1)
MONOCYTES NFR BLD: 3 % (ref 5–13)
NEUTS BAND NFR BLD MANUAL: 17 % (ref 0–6)
NEUTS SEG # BLD: 10.6 K/UL (ref 1.8–8)
NEUTS SEG NFR BLD: 76 % (ref 32–75)
PLATELET # BLD AUTO: 128 K/UL (ref 150–400)
PLATELET COMMENTS,PCOM: ABNORMAL
RBC # BLD AUTO: 3.61 M/UL (ref 3.8–5.2)
RBC MORPH BLD: ABNORMAL
WBC # BLD AUTO: 11.4 K/UL (ref 3.6–11)

## 2017-09-15 PROCEDURE — 65410000002 HC RM PRIVATE OB

## 2017-09-15 PROCEDURE — 74011250637 HC RX REV CODE- 250/637: Performed by: OBSTETRICS & GYNECOLOGY

## 2017-09-15 PROCEDURE — 85025 COMPLETE CBC W/AUTO DIFF WBC: CPT | Performed by: OBSTETRICS & GYNECOLOGY

## 2017-09-15 PROCEDURE — 36415 COLL VENOUS BLD VENIPUNCTURE: CPT | Performed by: OBSTETRICS & GYNECOLOGY

## 2017-09-15 PROCEDURE — 74011250636 HC RX REV CODE- 250/636: Performed by: ANESTHESIOLOGY

## 2017-09-15 RX ORDER — OXYCODONE AND ACETAMINOPHEN 5; 325 MG/1; MG/1
1-2 TABLET ORAL
Status: DISCONTINUED | OUTPATIENT
Start: 2017-09-15 | End: 2017-09-18 | Stop reason: HOSPADM

## 2017-09-15 RX ORDER — HYDROCORTISONE 1 %
CREAM (GRAM) TOPICAL 2 TIMES DAILY
Status: DISCONTINUED | OUTPATIENT
Start: 2017-09-15 | End: 2017-09-18 | Stop reason: HOSPADM

## 2017-09-15 RX ORDER — DOCUSATE SODIUM 100 MG/1
100 CAPSULE, LIQUID FILLED ORAL 2 TIMES DAILY
Status: DISCONTINUED | OUTPATIENT
Start: 2017-09-15 | End: 2017-09-18 | Stop reason: HOSPADM

## 2017-09-15 RX ORDER — OXYCODONE AND ACETAMINOPHEN 5; 325 MG/1; MG/1
1 TABLET ORAL
Status: DISCONTINUED | OUTPATIENT
Start: 2017-09-15 | End: 2017-09-15

## 2017-09-15 RX ADMIN — IBUPROFEN 800 MG: 400 TABLET, FILM COATED ORAL at 19:41

## 2017-09-15 RX ADMIN — SIMETHICONE CHEW TAB 80 MG 80 MG: 80 TABLET ORAL at 11:42

## 2017-09-15 RX ADMIN — SIMETHICONE CHEW TAB 80 MG 80 MG: 80 TABLET ORAL at 19:41

## 2017-09-15 RX ADMIN — OXYCODONE HYDROCHLORIDE AND ACETAMINOPHEN 1 TABLET: 5; 325 TABLET ORAL at 15:18

## 2017-09-15 RX ADMIN — KETOROLAC TROMETHAMINE 30 MG: 30 INJECTION, SOLUTION INTRAMUSCULAR at 05:43

## 2017-09-15 RX ADMIN — DOCUSATE SODIUM 100 MG: 100 CAPSULE, LIQUID FILLED ORAL at 20:42

## 2017-09-15 RX ADMIN — IBUPROFEN 800 MG: 400 TABLET, FILM COATED ORAL at 11:33

## 2017-09-15 RX ADMIN — HYDROCORTISONE: 1 CREAM TOPICAL at 20:42

## 2017-09-15 RX ADMIN — DOCUSATE SODIUM 100 MG: 100 CAPSULE, LIQUID FILLED ORAL at 11:32

## 2017-09-15 RX ADMIN — OXYCODONE HYDROCHLORIDE AND ACETAMINOPHEN 1 TABLET: 5; 325 TABLET ORAL at 16:26

## 2017-09-15 RX ADMIN — OXYCODONE HYDROCHLORIDE AND ACETAMINOPHEN 1 TABLET: 5; 325 TABLET ORAL at 20:49

## 2017-09-15 NOTE — LACTATION NOTE
This note was copied from a baby's chart. I assisted mom with breast feeding this afternoon. Mom states she has been able to get the baby latched and nursing well on the left breast but she is having difficulty getting him latched on the right breast. I gave mom some tips of positioning and holding the baby. We were able to get him latched deeply on the right breast in the football hold. He began sucking rhythmically with audible swallows. Mom will watch for feeding cues and feed the baby when he is showing cues. If baby is sleepy at the 3 hour joycelyn mom can do some hand expression and offer the baby drops of colostrum. Possible tight frenulum visible to tip of middle of the tongue. Baby is able to get his tongue beyond his gumline but not beyond the lips. Charge nurse in nursery notified. Baby is latching well. Continued monitoring is advised to watch for milk transfer and for potential nipple damage.

## 2017-09-15 NOTE — LACTATION NOTE
This note was copied from a baby's chart. Initial Lactation Consultation: Infant born this afternoon via C/S to a  mom at 43 weeks gestation. Mom's first child was delivered at 31 weeks and was in the NICU. Mom pumped initially then nursed infant for 6 months using the breast shield. Mom had adequate milk supply. Infant seen at breast. Mom has an abundance of easily expressed colostrum. Baby nursing well   deep latch obtained, mother is comfortable, baby feeding vigorously with rhythmic suck, swallow, breathe pattern, frequent audible (gulping) swallowing, and evident milk transfer, both breasts offered, baby is asleep following feeding. [place] behaviors reviewed, Very sleepy in first 24 hours, mother must wake baby for feedings, offer hand expressed drops, baby usually will respond and feed vigorously 6-8 times in the first day, but is important to offer 8-12 times,  After baby wakes from deep sleep usually on the 2nd or 3rd day a new behavior pattern follows. Frequent feeding during this brief behavioral phase preceeding milk transition is called cluster feeding. Typical  behavior: baby becomes vigorous at the breast and wants to feed frequently- every 1-2 hours for several feedings. This is the normal process by which the baby demands his/her supply. This type of frequent feeding is the stimulation which causes lactogenesis II (milk coming in). Skin to skin and rooming in discussed with mom. The benefits include infants temperature regulation, decrease stress in mom and baby, increase in maternal milk production and allowing mom to see early feeding cues. Feeding Plan: Mother will keep baby skin to skin as often as possible, feed on demand, 8-12x/day , respond to feeding cues, obtain latch, listen for audible swallowing, be aware of signs of oxytocin release/ cramping,thirst,sleepiness while breastfeeding, offer both breasts,and will not limit feedings.

## 2017-09-15 NOTE — PROGRESS NOTES
Post-Operative  Day 1    Caryl Corona     Assessment: Post-Op day 1, stable    Plan:   1. Routine post-operative care   2. The risks and benefits of the circumcision  procedure and anesthesia including: bleeding, infection, variability of cosmetic results were discussed at length with the mother. She is aware that future repeat procedures may be necessary. She gives informed consent to proceed as noted and her questions are answered. Information for the patient's :  Kyle Medina [[id number]]   , Low Transverse   Patient doing well without significant complaint. Nausea and vomiting resolved, tolerating liquids, no flatus, beal in place. Vitals:  Visit Vitals    BP 92/52 (BP 1 Location: Right arm, BP Patient Position: During activity; Sitting)    Pulse 62    Temp 97.7 °F (36.5 °C)    Resp 16    SpO2 98%    Breastfeeding Yes     Temp (24hrs), Av °F (36.7 °C), Min:97.7 °F (36.5 °C), Max:98.3 °F (36.8 °C)      Last 24hr Input/Output:    Intake/Output Summary (Last 24 hours) at 09/15/17 1001  Last data filed at 17 1924   Gross per 24 hour   Intake             2500 ml   Output             3600 ml   Net            -1100 ml          Exam:        Patient without distress. Lungs clear. Abdomen, bowel sounds present, soft, expected tenderness, fundus firm Wound dressing intact     Perineum normal lochia noted               Lower extremities are negative for swelling, cords or tenderness.     Labs:   Lab Results   Component Value Date/Time    WBC 11.4 09/15/2017 06:56 AM    WBC 6.4 2017 10:00 AM    WBC 12.7 10/30/2014 07:20 AM    WBC 9.8 10/28/2014 03:42 AM    WBC 9.4 10/17/2014 10:25 AM    WBC 9.5 2014 11:49 AM    WBC 8.4 2013 03:11 PM    WBC 3.9 2013 09:14 AM    WBC 7.4 2013 05:25 AM    WBC 4.0 2012 08:08 AM    WBC 4.3 06/15/2010 01:20 PM    HGB 10.9 09/15/2017 06:56 AM    HGB 11.9 2017 10:00 AM    HGB 11.0 10/30/2014 07:20 AM    HGB 12.3 10/28/2014 03:42 AM    HGB 12.8 10/17/2014 10:25 AM    HGB 12.6 09/29/2014 11:49 AM    HGB 13.0 09/18/2013 03:11 PM    HGB 14.8 04/20/2013 09:14 AM    HGB 13.6 04/13/2013 05:25 AM    HGB 13.9 08/07/2012 08:08 AM    HGB 13.5 06/08/2012 08:45 AM    HGB 13.2 06/01/2012 08:09 AM    HGB 17.2 06/15/2010 01:20 PM    HCT 32.5 09/15/2017 06:56 AM    HCT 35.8 09/13/2017 10:00 AM    HCT 31.8 10/30/2014 07:20 AM    HCT 35.9 10/28/2014 03:42 AM    HCT 37.8 10/17/2014 10:25 AM    HCT 36.6 09/29/2014 11:49 AM    HCT 38.4 09/18/2013 03:11 PM    HCT 45.2 04/20/2013 09:14 AM    HCT 40.5 04/13/2013 05:25 AM    HCT 41.5 08/07/2012 08:08 AM    HCT 41.4 06/08/2012 08:45 AM    HCT 40.7 06/01/2012 08:09 AM    HCT 49.7 06/15/2010 01:20 PM    PLATELET 819 30/08/5381 06:56 AM    PLATELET 851 55/19/2859 10:00 AM    PLATELET 918 19/70/1169 07:20 AM    PLATELET 291 75/67/6211 03:42 AM    PLATELET 237 47/98/3769 10:25 AM    PLATELET 983 53/96/4195 11:49 AM    PLATELET 54 16/86/7300 03:11 PM    PLATELET 781 11/78/9025 09:14 AM    PLATELET 361 07/98/8386 05:25 AM    PLATELET 006 63/34/3714 08:08 AM    PLATELET 561 35/37/1395 08:45 AM    PLATELET 412 49/40/5993 08:09 AM    PLATELET 950 88/79/5513 01:20 PM       Recent Results (from the past 24 hour(s))   CBC WITH AUTOMATED DIFF    Collection Time: 09/15/17  6:56 AM   Result Value Ref Range    WBC 11.4 (H) 3.6 - 11.0 K/uL    RBC 3.61 (L) 3.80 - 5.20 M/uL    HGB 10.9 (L) 11.5 - 16.0 g/dL    HCT 32.5 (L) 35.0 - 47.0 %    MCV 90.0 80.0 - 99.0 FL    MCH 30.2 26.0 - 34.0 PG    MCHC 33.5 30.0 - 36.5 g/dL    RDW 13.1 11.5 - 14.5 %    PLATELET 588 (L) 890 - 400 K/uL    NEUTROPHILS 76 (H) 32 - 75 %    BAND NEUTROPHILS 17 (H) 0 - 6 %    LYMPHOCYTES 4 (L) 12 - 49 %    MONOCYTES 3 (L) 5 - 13 %    EOSINOPHILS 0 0 - 7 %    BASOPHILS 0 0 - 1 %    ABS. NEUTROPHILS 10.6 (H) 1.8 - 8.0 K/UL    ABS. LYMPHOCYTES 0.5 (L) 0.8 - 3.5 K/UL    ABS. MONOCYTES 0.3 0.0 - 1.0 K/UL    ABS.  EOSINOPHILS 0.0 0.0 - 0.4 K/UL    ABS.  BASOPHILS 0.0 0.0 - 0.1 K/UL    DF MANUAL      PLATELET COMMENTS LARGE PLATELETS      RBC COMMENTS NORMOCYTIC, NORMOCHROMIC

## 2017-09-15 NOTE — LACTATION NOTE
This note was copied from a baby's chart. Infant between feeds at time of visit. Mother reports baby has been nursing well then has sleepy times. Feeding on demand and skin to skin contact reviewed. Mother will call for assistance as needed.

## 2017-09-15 NOTE — ROUTINE PROCESS
Bedside and Verbal shift change report given to MAU Vail (oncoming nurse) by Odie Krabbe. Anita Canchola, RN (offgoing nurse). Report included the following information SBAR, Kardex, Procedure Summary, Intake/Output, MAR and Recent Results. 1 Dr. Jose Richmond notified pt has raised, erythemic rash bilateral edge of abdomen at inc level and pt experiencing increased pain after shower and ambulation- rated 6  Orders obtained.

## 2017-09-15 NOTE — OP NOTES
Operative Note    Name: Rosina Waters   Medical Record Number: [id number]   YOB: 1985  Today's Date: September 15, 2017      Pre-operative Diagnosis: REPEAT     Post-operative Diagnosis: same and delivered    Operation: low transverse  section Procedure(s):   SECTION    Surgeon(s):  Christiano Mireles MD    Anesthesia: Spinal    Prophylactic Antibiotics: Ancef  DVT Prophylaxis: Sequential Compression Devices         Fetal Description: schmid     Birth Information:   Information for the patient's :  Andrea Mcdaniel [[id number]]   Delivery of a 3.725 kg Male [2] infant on 2017 at 12:48 PM. Apgars were 9 and 10. Umbilical Cord:     Umbilical Cord Events:     Placenta:  removal with  appearance. Amniotic Fluid Volume: Moderate     Amniotic Fluid Description:             Placenta:  expressed Expressed    Estimated Blood Loss (ml):  600    Specimens: none           Complications:  none,   Procedure Detail:      After proper patient identification and consent, the patient was taken to the operating room, where spinal anesthesia was administered and found to be adequate. Kidd catheter had been placed using sterile technique. The patient was prepped and draped in the normal sterile fashion. The abdomen was entered using the Pfannenstiel technique. The peritoneum was entered sharply well superior to the bladder without any apparent injury. The bladder flap was created. A low transverse uterine incision was made with the scalpel  and extended with blunt finger dissection. Amniotomy was performed and the fluid was medium amount clear. The babys head was then delivered atraumatically. The cord was clamped and cut and the baby was handed off to Nursing staff in attendance. Placenta was then removed from the uterus. The uterus was curettaged with a moist lap pad and cleared of all clots and debris.  The uterine incision was closed with 0 monocryl, double layer  in running locking fashion with good hemostasis assured. Good hemostasis was again reassured. The rectus muscles were reapproximated with 2-0 vicryl. The fascia was closed with 0 PDS in a running fashion. Good hemostasis was assured. The skin was closed with a 4-0 vicryl subcuticular closure. The patient tolerated the procedure well. Sponge, lap, and needle counts were correct times three and the patient and baby were taken to recovery/postpartum room in stable condition.     Adam Ball MD  September 15, 2017  4:41 PM

## 2017-09-15 NOTE — PROGRESS NOTES
Anesthesia Post Operative Day 1      The patient is status post C Section with spinal  anesthesia and Duramorph for pain. The patient relates the following scales: pain,none ; itching, none ; nausea, none. All symptoms were treated with medications per protocol. The patient is up and ambulating and has minimal complaints. Plan: Continue to treat breakthrough symptoms as needed with protocol medications.

## 2017-09-16 PROCEDURE — 74011250637 HC RX REV CODE- 250/637: Performed by: OBSTETRICS & GYNECOLOGY

## 2017-09-16 PROCEDURE — 65410000002 HC RM PRIVATE OB

## 2017-09-16 RX ORDER — OXYCODONE AND ACETAMINOPHEN 5; 325 MG/1; MG/1
1-2 TABLET ORAL
Qty: 30 TAB | Refills: 0 | Status: SHIPPED | OUTPATIENT
Start: 2017-09-16

## 2017-09-16 RX ORDER — IBUPROFEN 800 MG/1
800 TABLET ORAL EVERY 8 HOURS
Qty: 30 TAB | Refills: 0 | Status: SHIPPED | OUTPATIENT
Start: 2017-09-16

## 2017-09-16 RX ORDER — ADHESIVE BANDAGE
30 BANDAGE TOPICAL DAILY PRN
Status: DISCONTINUED | OUTPATIENT
Start: 2017-09-16 | End: 2017-09-18 | Stop reason: HOSPADM

## 2017-09-16 RX ADMIN — ACETAMINOPHEN 650 MG: 325 TABLET, FILM COATED ORAL at 14:56

## 2017-09-16 RX ADMIN — OXYCODONE HYDROCHLORIDE AND ACETAMINOPHEN 2 TABLET: 5; 325 TABLET ORAL at 01:18

## 2017-09-16 RX ADMIN — IBUPROFEN 800 MG: 400 TABLET, FILM COATED ORAL at 12:53

## 2017-09-16 RX ADMIN — IBUPROFEN 800 MG: 400 TABLET, FILM COATED ORAL at 04:38

## 2017-09-16 RX ADMIN — DOCUSATE SODIUM 100 MG: 100 CAPSULE, LIQUID FILLED ORAL at 09:56

## 2017-09-16 RX ADMIN — IBUPROFEN 800 MG: 400 TABLET, FILM COATED ORAL at 20:57

## 2017-09-16 NOTE — ROUTINE PROCESS
2000- Bedside shift change report given to NANCI Schwartz (oncoming nurse) by Miguel Rosenthal RN (offgoing nurse). Report included the following information SBAR.

## 2017-09-16 NOTE — DISCHARGE SUMMARY
Obstetrical Discharge Summary     Name: Grant Hutson MRN: [id number]  SSN: [id number]    YOB: 1985  Age: 34 y.o. Sex: female      Admit Date: 2017    Discharge Date: 17    Admitting Physician: Rakesh Mathews MD     Attending Physician:  Rakesh Mathews MD     Admission Diagnoses: REPEAT   Pregnancy    Discharge Diagnoses:   Information for the patient's :  Alejandro Fletcher [[id number]]   Delivery of a 3.725 kg male infant via , Low Transverse on 2017 at 12:48 PM  by . Apgars were 9 and 10. Additional Diagnoses:   Hospital Problems  Date Reviewed: 2017          Codes Class Noted POA    Pregnancy ICD-10-CM: Z33.1  ICD-9-CM: V22.2  2017 Unknown             Lab Results   Component Value Date/Time    Rubella, External immune 2017    GrBStrep, External negative 2017       Hospital Course: Normal hospital course following the delivery. Disposition at Discharge: Home or self care    Discharged Condition: Stable    Patient Instructions:   Current Discharge Medication List      CONTINUE these medications which have CHANGED    Details   ibuprofen (MOTRIN) 800 mg tablet Take 1 Tab by mouth every eight (8) hours. Qty: 30 Tab, Refills: 0      oxyCODONE-acetaminophen (PERCOCET) 5-325 mg per tablet Take 1-2 Tabs by mouth every four (4) hours as needed. Max Daily Amount: 12 Tabs. Qty: 30 Tab, Refills: 0         CONTINUE these medications which have NOT CHANGED    Details   PNV no.24-iron-folic acid-dha (PRENATAL DHA+COMPLETE PRENATAL) -300 mg-mcg-mg cmpk Take  by mouth. Indications: PREGNANCY         STOP taking these medications       aspirin 81 mg chewable tablet Comments:   Reason for Stopping:               Reference my discharge instructions.     Follow-up Appointments   Procedures    FOLLOW UP VISIT Appointment in: 6 Weeks neema bethea     Standing Status:   Standing     Number of Occurrences:   1     Order Specific Question:   Appointment in     Answer:   6 Weeks        Signed By:  Max Monteiro MD     September 16, 2017

## 2017-09-16 NOTE — LACTATION NOTE
This note was copied from a baby's chart. Baby nursing well today,  deep latch obtained, mother is comfortable, baby feeding vigorously with rhythmic suck,frequent gulping swallows noted,  and evident milk transfer, both breasts offered, baby is asleep following feeding. Mom encouraged to make sure infant latches deeply to avoid sore nipples and trauma as well as for lactogenesis. Breasts may become engorged when milk \"comes in\". How milk is made / normal phases of milk production, supply and demand discussed. Taught care of engorged breasts - frequent breastfeeding encouraged, warm compresses and breast massage ac. Then nurse the baby or pump. Apply cold compresses pc x 15 minutes a few times a day for swelling or discomfort. May need to do this care for a couple of days. Prevention and treatment of mastitis discussed. Feeding Plan: Mother will keep baby skin to skin as often as possible, feed on demand, 8-12x/day , respond to feeding cues, obtain latch, listen for audible swallowing, be aware of signs of oxytocin release/ cramping,thirst,sleepiness while breastfeeding, offer both breasts,and will not limit feedings. Mom will continue to use breast massage while nursing to facilitate lactogenesis.

## 2017-09-16 NOTE — PROGRESS NOTES
Post-Operative  Day 2    Caryl Corona     Assessment: Post-Op day 2, doing well    Plan:   1. Routine post-operative care  2. May want to go home, will prepare for DC in case      Information for the patient's :  aJrrod Almodovar [[id number]]   , Low Transverse   Patient doing well without significant complaint. Nausea and vomiting resolved, tolerating PO, passing flatus, voiding and ambulating without difficulty. Vitals:  Visit Vitals    BP 98/60 (BP 1 Location: Right arm, BP Patient Position: At rest)    Pulse (!) 53    Temp 98 °F (36.7 °C)    Resp 16    SpO2 98%    Breastfeeding Yes     Temp (24hrs), Av.9 °F (36.6 °C), Min:97.7 °F (36.5 °C), Max:98 °F (36.7 °C)        Exam:        Patient without distress. Abdomen,  soft, expected tenderness, fundus firm                Wound incision clean, dry and intact- dressing               Lower extremities are symmetric without tenderness, cords or erythema.     Labs:   Lab Results   Component Value Date/Time    WBC 11.4 09/15/2017 06:56 AM    WBC 6.4 2017 10:00 AM    WBC 12.7 10/30/2014 07:20 AM    WBC 9.8 10/28/2014 03:42 AM    WBC 9.4 10/17/2014 10:25 AM    WBC 9.5 2014 11:49 AM    WBC 8.4 2013 03:11 PM    WBC 3.9 2013 09:14 AM    WBC 7.4 2013 05:25 AM    WBC 4.0 2012 08:08 AM    WBC 4.3 06/15/2010 01:20 PM    HGB 10.9 09/15/2017 06:56 AM    HGB 11.9 2017 10:00 AM    HGB 11.0 10/30/2014 07:20 AM    HGB 12.3 10/28/2014 03:42 AM    HGB 12.8 10/17/2014 10:25 AM    HGB 12.6 2014 11:49 AM    HGB 13.0 2013 03:11 PM    HGB 14.8 2013 09:14 AM    HGB 13.6 2013 05:25 AM    HGB 13.9 2012 08:08 AM    HGB 13.5 2012 08:45 AM    HGB 13.2 2012 08:09 AM    HGB 17.2 06/15/2010 01:20 PM    HCT 32.5 09/15/2017 06:56 AM    HCT 35.8 2017 10:00 AM    HCT 31.8 10/30/2014 07:20 AM    HCT 35.9 10/28/2014 03:42 AM    HCT 37.8 10/17/2014 10:25 AM HCT 36.6 09/29/2014 11:49 AM    HCT 38.4 09/18/2013 03:11 PM    HCT 45.2 04/20/2013 09:14 AM    HCT 40.5 04/13/2013 05:25 AM    HCT 41.5 08/07/2012 08:08 AM    HCT 41.4 06/08/2012 08:45 AM    HCT 40.7 06/01/2012 08:09 AM    HCT 49.7 06/15/2010 01:20 PM    PLATELET 229 10/71/5967 06:56 AM    PLATELET 317 35/06/0996 10:00 AM    PLATELET 235 86/80/4557 07:20 AM    PLATELET 326 62/71/6818 03:42 AM    PLATELET 375 62/76/3123 10:25 AM    PLATELET 704 96/75/8991 11:49 AM    PLATELET 54 79/33/4340 03:11 PM    PLATELET 973 62/42/3178 09:14 AM    PLATELET 162 74/70/4411 05:25 AM    PLATELET 069 51/61/1635 08:08 AM    PLATELET 062 18/10/6267 08:45 AM    PLATELET 731 03/57/3973 08:09 AM    PLATELET 912 38/48/8859 01:20 PM       No results found for this or any previous visit (from the past 24 hour(s)).

## 2017-09-16 NOTE — PROGRESS NOTES
Report received from NANCI Villasenor RN using SBAR.    1200: Pt walking off unit to get some fresh air.

## 2017-09-17 VITALS
HEART RATE: 76 BPM | DIASTOLIC BLOOD PRESSURE: 69 MMHG | RESPIRATION RATE: 12 BRPM | SYSTOLIC BLOOD PRESSURE: 107 MMHG | TEMPERATURE: 97.5 F | OXYGEN SATURATION: 98 %

## 2017-09-17 PROCEDURE — 74011250637 HC RX REV CODE- 250/637: Performed by: OBSTETRICS & GYNECOLOGY

## 2017-09-17 RX ADMIN — IBUPROFEN 800 MG: 400 TABLET, FILM COATED ORAL at 04:56

## 2017-09-17 NOTE — ROUTINE PROCESS
Faculty or Preceptor Review of Beatriz Cristobal RN    9/17/2017  - Shift times - 1930 to 0800    The orientee documentation of patient care for [facility] has been reviewed and approved. All medications have been administered under the direct supervision of the faculty or preceptor.     Alfredo Barbosa RN

## 2017-09-17 NOTE — DISCHARGE INSTRUCTIONS
Remove bandage in one week. Milk of magnesia for BMs      Section: What to Expect at [address] [facility]    A  section, or , is surgery to deliver your baby through a cut, called an incision, that the doctor makes in your lower belly and uterus. You may have some pain in your lower belly and need pain medicine for 1 to 2 weeks. You can expect some vaginal bleeding for several weeks. You will probably need about 6 weeks to fully recover. It is important to take it easy while the incision is healing. Avoid heavy lifting, strenuous activities, or exercises that strain the belly muscles while you are recovering. Ask a family member or friend for help with housework, cooking, and shopping. This care sheet gives you a general idea about how long it will take for you to recover. But each person recovers at a different pace. Follow the steps below to get better as quickly as possible. How can you care for yourself at home? Activity  · Rest when you feel tired. Getting enough sleep will help you recover. · Try to walk each day. Start by walking a little more than you did the day before. Bit by bit, increase the amount you walk. Walking boosts blood flow and helps prevent pneumonia, constipation, and blood clots. · Avoid strenuous activities, such as bicycle riding, jogging, weightlifting, and aerobic exercise, for 6 weeks or until your doctor says it is okay. · Until your doctor says it is okay, do not lift anything heavier than your baby. · Do not do sit-ups or other exercises that strain the belly muscles for 6 weeks or until your doctor says it is okay. · Hold a pillow over your incision when you cough or take deep breaths. This will support your belly and decrease your pain. · You may shower as usual. Pat the incision dry when you are done. · You will have some vaginal bleeding. Wear sanitary pads. Do not douche or use tampons until your doctor says it is okay.   · Ask your doctor when you can drive again. · You will probably need to take at least 6 weeks off work. It depends on the type of work you do and how you feel. · Ask your doctor when it is okay for you to have sex. Diet  · You can eat your normal diet. If your stomach is upset, try bland, low-fat foods like plain rice, broiled chicken, toast, and yogurt. · Drink plenty of fluids (unless your doctor tells you not to). · You may notice that your bowel movements are not regular right after your surgery. This is common. Try to avoid constipation and straining with bowel movements. You may want to take a fiber supplement every day. If you have not had a bowel movement after a couple of days, ask your doctor about taking a mild laxative. · If you are breastfeeding, do not drink any alcohol. Medicines  · Your doctor will tell you if and when you can restart your medicines. He or she will also give you instructions about taking any new medicines. · If you take blood thinners, such as warfarin (Coumadin), clopidogrel (Plavix), or aspirin, be sure to talk to your doctor. He or she will tell you if and when to start taking those medicines again. Make sure that you understand exactly what your doctor wants you to do. · Take pain medicines exactly as directed. ¨ If the doctor gave you a prescription medicine for pain, take it as prescribed. ¨ If you are not taking a prescription pain medicine, ask your doctor if you can take an over-the-counter medicine. · If you think your pain medicine is making you sick to your stomach:  ¨ Take your medicine after meals (unless your doctor has told you not to). ¨ Ask your doctor for a different pain medicine. · If your doctor prescribed antibiotics, take them as directed. Do not stop taking them just because you feel better. You need to take the full course of antibiotics. Incision care  · If you have strips of tape on the incision, leave the tape on for a week or until it falls off.   · Wash the area daily with warm, soapy water, and pat it dry. Don't use hydrogen peroxide or alcohol, which can slow healing. You may cover the area with a gauze bandage if it weeps or rubs against clothing. Change the bandage every day. · Keep the area clean and dry. Other instructions  · If you breastfeed your baby, you may be more comfortable while you are healing if you place the baby so that he or she is not resting on your belly. Try tucking your baby under your arm, with his or her body along the side you will be feeding on. Support your baby's upper body with your arm. With that hand you can control your baby's head to bring his or her mouth to your breast. This is sometimes called the neoSurgical hold. Follow-up care is a key part of your treatment and safety. Be sure to make and go to all appointments, and call your doctor if you are having problems. It's also a good idea to know your test results and keep a list of the medicines you take. When should you call for help? Call 911 anytime you think you may need emergency care. For example, call if:  · You passed out (lost consciousness). · You have symptoms of a blood clot in your lung (called a pulmonary embolism). These may include:  ¨ Sudden chest pain. ¨ Trouble breathing. ¨ Coughing up blood. · You have thoughts of harming yourself, your baby, or another person. Call your doctor now or seek immediate medical care if:  · You have severe vaginal bleeding. This means that you are soaking through a pad every hour for 2 or more hours. · You are dizzy or lightheaded, or you feel like you may faint. · You have new or more belly pain. · You have loose stitches, or your incision comes open. · You have symptoms of infection, such as:  ¨ Increased pain, swelling, warmth, or redness. ¨ Red streaks leading from the incision. ¨ Pus draining from the incision. ¨ A fever.   · You have symptoms of a blood clot in your leg (called a deep vein thrombosis), such as:  ¨ Pain in your calf, back of the knee, thigh, or groin. ¨ Redness and swelling in your leg or groin. Watch closely for changes in your health, and be sure to contact your doctor if:  · You feel sad, anxious, or hopeless for more than a few days. · You do not get better as expected. Where can you learn more? Go to http://tyrell-luis.info/. Enter M806 in the search box to learn more about \" Section: What to Expect at Home. \"  Current as of: 2017  Content Version: 11.3  © 3283-6992 [employer]. Care instructions adapted under license by [employer] (which disclaims liability or warranty for this information). If you have questions about a medical condition or this instruction, always ask your healthcare professional. [address] any warranty or liability for your use of this information.

## 2017-09-17 NOTE — PROGRESS NOTES
Post-Operative  Day 3    Caryl Corona       Assessment: Post-Op day 3, doing well    Plan:   1. Discharge home today  2. Follow up in office in 6 weeks with Liza Vargas MD  3. Post partum activity/wound care advised, diet as tolerated  4. Discharge Medications: pain medication per discharge summary and medications prior to admission      Information for the patient's :  Maxx Grimes [[id number]]   , Low Transverse   Patient doing well without significant complaint. Tolerating diet, passing flatus, voiding and ambulating without difficulty    Vitals:  Visit Vitals    /70 (BP 1 Location: Left arm, BP Patient Position: At rest)    Pulse 60    Temp 97.7 °F (36.5 °C)    Resp 16    SpO2 98%    Breastfeeding Yes     Temp (24hrs), Av.1 °F (36.7 °C), Min:97.7 °F (36.5 °C), Max:98.3 °F (36.8 °C)        Exam:        Patient without distress. Abdomen, bowel sounds present, soft, expected tenderness, fundus firm                Wound incision clean, dry and intact- dressing on               Lower extremities are symmetric without tenderness, cords or erythema.     Labs:   Lab Results   Component Value Date/Time    WBC 11.4 09/15/2017 06:56 AM    WBC 6.4 2017 10:00 AM    WBC 12.7 10/30/2014 07:20 AM    WBC 9.8 10/28/2014 03:42 AM    WBC 9.4 10/17/2014 10:25 AM    WBC 9.5 2014 11:49 AM    WBC 8.4 2013 03:11 PM    WBC 3.9 2013 09:14 AM    WBC 7.4 2013 05:25 AM    WBC 4.0 2012 08:08 AM    WBC 4.3 06/15/2010 01:20 PM    HGB 10.9 09/15/2017 06:56 AM    HGB 11.9 2017 10:00 AM    HGB 11.0 10/30/2014 07:20 AM    HGB 12.3 10/28/2014 03:42 AM    HGB 12.8 10/17/2014 10:25 AM    HGB 12.6 2014 11:49 AM    HGB 13.0 2013 03:11 PM    HGB 14.8 2013 09:14 AM    HGB 13.6 2013 05:25 AM    HGB 13.9 2012 08:08 AM    HGB 13.5 2012 08:45 AM    HGB 13.2 2012 08:09 AM    HGB 17.2 06/15/2010 01:20 PM    HCT 32.5 09/15/2017 06:56 AM    HCT 35.8 09/13/2017 10:00 AM    HCT 31.8 10/30/2014 07:20 AM    HCT 35.9 10/28/2014 03:42 AM    HCT 37.8 10/17/2014 10:25 AM    HCT 36.6 09/29/2014 11:49 AM    HCT 38.4 09/18/2013 03:11 PM    HCT 45.2 04/20/2013 09:14 AM    HCT 40.5 04/13/2013 05:25 AM    HCT 41.5 08/07/2012 08:08 AM    HCT 41.4 06/08/2012 08:45 AM    HCT 40.7 06/01/2012 08:09 AM    HCT 49.7 06/15/2010 01:20 PM    PLATELET 690 06/39/0665 06:56 AM    PLATELET 038 12/40/0250 10:00 AM    PLATELET 211 71/04/2819 07:20 AM    PLATELET 014 83/53/0758 03:42 AM    PLATELET 672 49/88/5796 10:25 AM    PLATELET 085 48/72/4550 11:49 AM    PLATELET 54 64/13/7050 03:11 PM    PLATELET 694 64/22/1548 09:14 AM    PLATELET 022 54/10/6397 05:25 AM    PLATELET 995 46/34/9253 08:08 AM    PLATELET 765 43/65/7738 08:45 AM    PLATELET 495 01/65/9938 08:09 AM    PLATELET 079 89/65/2812 01:20 PM       No results found for this or any previous visit (from the past 24 hour(s)).

## 2017-09-17 NOTE — ROUTINE PROCESS
Bedside shift change report given to Philip Buck RN (oncoming nurse) by Malick Escamilla RN (offgoing nurse). Report included the following information SBAR, Kardex, Intake/Output, MAR, Accordion and Recent Results.      4979: mom fell asleep with baby in bed, educated mom on safe sleep area for baby, baby returned to [facility] and taken to nursery for mom to sleep

## 2017-09-17 NOTE — LACTATION NOTE
This note was copied from a baby's chart. Baby nursing well and has improved throughout post partum stay, deep latch maintained, mother is comfortable, mom's milk is in, baby feeding vigorously with rhythmic suck, swallow, breathe pattern, with audible gulps, and evident milk transfer, both breasts offerd, baby is asleep following feeding. Baby is feeding on demand, voiding and stools present as appropriate over the last 24 hours. Mom experiencing engorgement. Strategies for latching the infant to the engorged breast were discussed. Taught care of engorged breasts - frequent breastfeeding encouraged, warm compresses and breast massage ac. Then nurse the baby or pump. Apply cold compresses pc x 15 minutes a few times a day for swelling or discomfort. May need to do this care for a couple of days. Handout provided. Mom to continue feeding infant 8-12 times in 24 hours, awakening infant when necessary during the time of engorgement. Techniques for waking a sleeping infant demonstrated.

## 2022-03-19 PROBLEM — Z34.90 PREGNANCY: Status: ACTIVE | Noted: 2017-09-14

## 2022-05-24 ENCOUNTER — HOSPITAL ENCOUNTER (EMERGENCY)
Age: 37
Discharge: HOME | End: 2022-05-24
Attending: EMERGENCY MEDICINE
Payer: COMMERCIAL

## 2022-05-24 VITALS
SYSTOLIC BLOOD PRESSURE: 102 MMHG | DIASTOLIC BLOOD PRESSURE: 64 MMHG | HEART RATE: 52 BPM | RESPIRATION RATE: 16 BRPM | TEMPERATURE: 97.9 F | OXYGEN SATURATION: 99 %

## 2022-05-24 DIAGNOSIS — F41.1 ANXIETY STATE: Primary | ICD-10-CM

## 2022-05-24 DIAGNOSIS — G47.9 TROUBLE IN SLEEPING: ICD-10-CM

## 2022-05-24 PROCEDURE — 74011250637 HC RX REV CODE- 250/637: Performed by: EMERGENCY MEDICINE

## 2022-05-24 PROCEDURE — 99283 EMERGENCY DEPT VISIT LOW MDM: CPT

## 2022-05-24 RX ORDER — HYDROXYZINE 25 MG/1
50 TABLET, FILM COATED ORAL
Status: COMPLETED | OUTPATIENT
Start: 2022-05-24 | End: 2022-05-24

## 2022-05-24 RX ORDER — HYDROXYZINE 25 MG/1
25 TABLET, FILM COATED ORAL
Qty: 20 TABLET | Refills: 0 | Status: SHIPPED | OUTPATIENT
Start: 2022-05-24 | End: 2022-06-03

## 2022-05-24 RX ADMIN — HYDROXYZINE HYDROCHLORIDE 50 MG: 25 TABLET ORAL at 02:36

## 2022-05-24 NOTE — BSMART NOTE
Comprehensive Assessment Form Part 1      Section I - Disposition    DDx; Generalized Anxiety D/O per pt's report       The Medical Doctor to Psychiatrist conference was not completed. The Medical Doctor is in agreement with Psychiatrist disposition because of (reason) Pt is not meeting criteria for psych hospitalization. The plan is to discharge Pt with psychiatrist resources. The on-call Psychiatrist consulted was Dr. Mj Greene  The admitting Psychiatrist will be Dr. Mj Greene  The admitting Diagnosis is N/A  The Payor source is [employer]    Section II - Integrated Summary  Summary:   Per Triage Note: \"She reports taking some xanax around 9pm for some anxiety she has had for several days and some brain fog. She awoke at midnight shaking. She had COVID 3 weeks ago and has had some problems getting her energy back. \"    Pt is 43 y/o female who was transported to [facility] by her / Marcella Love. Writer met with Pt face-to-face at bedside and offered quiet room for additional privacy which was declined. Pt presented as sleepy and oriented x4. Pt denied current SI or history of attempts, although reported to feel \" in despair\", due to not being able to get a full night of undisturbed sleep. Pt does report to get 7-8 hours of sleep per day waking up every other hour, due to her 8 y/o child struggling to sleep in the home. Pt reported to have last slept a full 7-8 hours through on 5.13.22, due to her children (ages 3 and 9) being away at her sister's home. Pt denied HI, AH/VH, and concerns for changes in her appetite. Pt denying any substance use besides her prescribed xanax that was last taken prior to ED visit. Also present during interview was  Raquel Eng) who she provided consent to be present. Pt presented as tearful and anxious during today's interview and struggled to answer questions, due to her reporting to be tired and exhausted.  Pt was dressed appropriately and visibly appeared to be exhausted, evident by her eyes being red and reporting to become \"shaky\", due to having to answer questions. Pt stated \"I need to up the dosage of my xanax or something so I can sleep \" when writer inquired of how writer could support Pt during interview. Writer educated Pt on both OP and inpatient options to assist in treatment for anxiety, resulting in Pt requesting to try the less restrictive option of following up with PCP Cesilia Bowser) and seeking psychiatry providers before moving forward with seeking a psych hospitalization. No grounds to seek TDO     After assessing Pt. Writer consulted ED,Provider who informed writer of Pt previously endorsing SI before meeting with writer without a plan. Writer reassessed Pt during this time and spoke with  present, who to did show writer text message from Orthodox counselor from several days ago of Pt endorsing SI. Writer processed with Pt further who was determine to be no risk during time of reassessment, due to Pt and  reporting no current SI concerns or plan. Pt and  also informed writer of no previous attempts or safety risk at home. Pt and  requested to be discharged and follow-up with PCP and accept resources for psychiatry. Pt informed writer that she did feel safe to return home and believe she could get sleep if prescribed something , due to her children being away safe with grandparents/family for the night. Writer updated ED,Provider who was in agreement with Pt to d/c home. The patient has demonstrated mental capacity to provide informed consent. The information is given by the patient. The Chief Complaint is sleep disturbance and anxiety . The Precipitant Factors are sleep disturbance, concerns with children sleeping, ongoing heightened anxiety  . Previous Hospitalizations: no  The patient has not previously been in restraints. Current Psychiatrist and/or  is none current. Writer provided resources .     Lethality Assessment:    The potential for suicide noted by the following: none. The potential for homicide is not noted. The patient has not been a perpetrator of sexual or physical abuse. There are not pending charges. The patient is not felt to be at risk for self harm or harm to others. The attending nurse was advised that security has not been notified. Section III - Psychosocial  The patient's overall mood and attitude is anxious and irritable. Feelings of helplessness and hopelessness are observed by Pt reporting \"I just don't know what to do to get sleep \". Generalized anxiety is observed by Pt reporting racing thoughts and shaking. Panic is observed by Pt presenting and reporting to feel overwhelmed by the discussion of potential inpatient psych hospitalization. Phobias are not observed. Obsessive compulsive tendencies are not observed. Section IV - Mental Status Exam  The patient's appearance is tense. The patient's behavior is restless and tearful. The patient is oriented to time, place, person and situation. The patient's speech shows no evidence of impairment. The patient's mood is anxious, is sad and is irritable. The range of affect is labile. The patient's thought content demonstrates no evidence of impairment. The thought process shows no evidence of impairment. The patient's perception shows no evidence of impairment. The patient's memory shows no evidence of impairment. The patient's appetite shows no evidence of impairment. The patient's sleep has evidence of insomnia. The patient's insight shows no evidence of impairment. The patient's judgement shows no evidence of impairment. Section V - Substance Abuse  The patient is not using substances. Section VI - Living Arrangements  The patient is . The spouses approximate age is N/A and appears to be in good health. The patient lives with a spouse and with a child/children.  The patient has 2 children ages 3 and 9 y/o.  The patient does plan to return home upon discharge. The patient does not have legal issues pending. The patient's source of income comes from N/A. Islam and cultural practices have not been voiced at this time. The patient's greatest support comes from   and this person will be involved with the treatment. The patient has not been in an event described as horrible or outside the realm of ordinary life experience either currently or in the past.  The patient has not been a victim of sexual/physical abuse. Section VII - Other Areas of Clinical Concern  The highest grade achieved is N/A with the overall quality of school experience being described as N/A. The patient is currently N/A and speaks [place] as a primary language. The patient has no communication impairments affecting communication. The patient's preference for learning can be described as: can read and write adequately.   The patient's hearing is normal.  The patient's vision is normal.      Kyle Rosa, [facility]

## 2022-05-24 NOTE — ED PROVIDER NOTES
Please note that this dictation was completed with [employer], the computer voice recognition software.  Quite often unanticipated grammatical, syntax, homophones, and other interpretive errors are inadvertently transcribed by the computer software.  Please disregard these errors.  Please excuse any errors that have escaped final proofreading. Patient is a 27-year-old female presenting to ED for evaluation of anxiety and suicidal thoughts. She states that for the past several months she has not been sleeping and has felt worsening anxiety. Notes that she has had a history of anxiety in the past but has not been on medication recently. Recently recovered from a COVID infection several weeks ago and she feels like her symptoms sleeping and anxiety have been worsening since then. She is seen last week by her primary care physician who started her on Xanax and BuSpar, she has taken 1 dose of the BuSpar and states that she did not like the side effects that she has not continued. Has been taking a Xanax to sleep, which she states she took tonight and she woke up 2 hours later and felt like she was shaking all over. She then took another half tablet of the Xanax without much relief. She states that she has been feeling suicidal with thoughts of self-harm, but denies any specific plan or attempt. Denies any prior psychiatric hospitalizations. Has been following with a counselor for the past few weeks. 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 diagnosed summer 2013 with 2 uteruses, 2 vaginas, and 2 cervixs. exploratory surgery last summer is when MD diagnosed and fixed vaginal and uterus septum. 2 cervixs still remain.     Other unknown and unspecified cause of morbidity or mortality     uterine anomoly, 2 cervixes and uterine septum, repaired    Other unknown and unspecified cause of morbidity or mortality     vaginal prolape approx 31 weeks  Psychiatric problem     anxiety    Vaso vagal episode        Past Surgical History:   Procedure Laterality Date    HX DILATION AND CURETTAGE  14    MAB    HX OTHER SURGICAL      laparoscopy and hystoscopy, D&C    HX OTHER SURGICAL  10/2013    vaginal septum and uterine septum removal (Dr. Clare Landry)    [place]  DELIV[place]           Family History:   Problem Relation Age of Onset    Dementia Mother     Cancer Maternal Grandfather         prostate cancer    Dementia Paternal Grandmother        Social History     Socioeconomic History    Marital status:      Spouse name: Not on file    Number of children: Not on file    Years of education: Not on file    Highest education level: Not on file   Occupational History    Not on file   Tobacco Use    Smoking status: Never Smoker    Smokeless tobacco: Never Used   Substance and Sexual Activity    Alcohol use: No    Drug use: No    Sexual activity: Yes     Partners: Male     Birth control/protection: Pill   Other Topics Concern    Not on file   Social History Narrative    Not on file     Social Determinants of Health     Financial Resource Strain:     Difficulty of Paying Living Expenses: Not on file   Food Insecurity:     Worried About [address] in the Last Year: Not on file    [address] in the Last Year: Not on file   Transportation Needs:     Lack of Transportation (Medical): Not on file    Lack of Transportation (Non-Medical):  Not on file   Physical Activity:     Days of Exercise per Week: Not on file    Minutes of Exercise per Session: Not on file   Stress:     Feeling of Stress : Not on file   Social Connections:     Frequency of Communication with Friends and Family: Not on file    Frequency of Social Gatherings with Friends and Family: Not on file    Attends [employer] Services: Not on file    Active Member of Clubs or Organizations: Not on file    Attends Club or Organization Meetings: Not on file   [facility] Marital Status: Not on file   Intimate Partner Violence:     Fear of Current or Ex-Partner: Not on file    Emotionally Abused: Not on file    Physically Abused: Not on file    Sexually Abused: Not on file   Housing Stability:     Unable to Pay for Housing in the Last Year: Not on file    Number of [place] in the Last Year: Not on file    Unstable Housing in the Last Year: Not on file         ALLERGIES: Patient has no known allergies. Review of Systems   Constitutional: Negative for chills and fever. HENT: Negative for congestion, ear pain and sore throat. Eyes: Negative for visual disturbance. Respiratory: Negative for cough and shortness of breath. Cardiovascular: Negative for chest pain. Gastrointestinal: Negative for abdominal pain, diarrhea, nausea and vomiting. Genitourinary: Negative for dysuria and flank pain. Musculoskeletal: Negative for back pain. Skin: Negative for color change. Neurological: Negative for dizziness and headaches. Psychiatric/Behavioral: Positive for suicidal ideas. Negative for confusion. The patient is nervous/anxious. Vitals:    05/24/22 0052   BP: 104/70   Pulse: (!) 58   Resp: 16   Temp: 97.8 °F (36.6 °C)   SpO2: 98%            Physical Exam  Vitals and nursing note reviewed. Constitutional:       General: She is not in acute distress. Appearance: Normal appearance. She is not ill-appearing. HENT:      Head: Normocephalic and atraumatic. Eyes:      General: Vision grossly intact. Extraocular Movements: Extraocular movements intact. Conjunctiva/sclera: Conjunctivae normal.   Neck:      Trachea: Phonation normal.   Cardiovascular:      Rate and Rhythm: Normal rate and regular rhythm. Heart sounds: Normal heart sounds. Pulmonary:      Effort: Pulmonary effort is normal.      Breath sounds: Normal breath sounds and air entry. Abdominal:      Palpations: Abdomen is soft. Tenderness:  There is no abdominal tenderness. Musculoskeletal:         General: Normal range of motion. Skin:     General: Skin is warm and dry. Neurological:      General: No focal deficit present. Mental Status: She is alert and oriented to person, place, and time. Psychiatric:         Mood and Affect: Mood is anxious. Behavior: Behavior normal.         Thought Content: Thought content includes suicidal ideation. Thought content does not include homicidal ideation. Thought content does not include homicidal or suicidal plan. MDM  Number of Diagnoses or Management Options  Diagnosis management comments: Patient is alert, afebrile, vital stable. Presents with worsening insomnia, anxiety, and suicidal thoughts. No specific plan or attempt.  at bedside. ALEXANDRIA hughes has seen patient and does not feel she is meeting criteria for admission at this time and has provided outpatient resources for her. Patient feels most of her symptoms are coming from her difficulty sleeping. Patient and her  are comfortable with this plan. We will give as needed hydroxyzine to aid with her anxiety and sleep and she will follow-up with her PCP, counselor, and psychiatry as needed. She is discharged from ED in stable condition. Return precautions outlined. All questions answered at this time. 2:30 AM  Pt has been reevaluated. There are no new complaints, changes, or physical findings at this time. All results have been reviewed with patient and/or family. Medications have been reviewed w/ pt and/or family. Pt and/or family's questions have been answered. Pt and/or family expressed good understanding of the dx/tx/rx and is in agreement with plan of care. Pt instructed and agreed to f/u w/ PCP and psych and to return to ED upon further deterioration. Pt is ready for discharge. IMPRESSION:  1. Anxiety state    2. Trouble in sleeping        PLAN:  1.    Current Discharge Medication List      START taking these medications Details   hydrOXYzine HCL (ATARAX) 25 mg tablet Take 1 Tablet by mouth every six (6) hours as needed for Anxiety for up to 10 days. Qty: 20 Tablet, Refills: 0  Start date: 5/24/2022, End date: 6/3/2022           2.    Follow-up Information     Follow up With Specialties Details Why Contact Info    Luba Garcia MD [facility] Medicine Schedule an appointment as soon as possible for a visit   [address] [place], [place] [id number] [phone]      Counselor  Schedule an appointment as soon as possible for a visit               Return to ED if worse          Procedures

## 2022-05-24 NOTE — ED NOTES
Patient provided with AVS. Discussed new prescription and discharge instructions with patient. No questions or concerns at this time.  Patient ambulatory to ED exit without difficulty

## 2022-05-24 NOTE — BSMART NOTE
BSMART assessment completed, and suicide risk level noted to be none. Primary Nurse Demi Joseph and Physician [address] [place] [address] notified. Concerns not observed. Security/Off- has not been notified.

## 2022-05-24 NOTE — ED TRIAGE NOTES
She reports taking some xanax around 9pm for some anxiety she has had for several days and some brain fog. She awoke at midnight shaking. She had COVID 3 weeks ago and has had some problems getting her energy back.

## 2023-01-16 ENCOUNTER — APPOINTMENT (OUTPATIENT)
Dept: GENERAL RADIOLOGY | Age: 38
End: 2023-01-16
Attending: EMERGENCY MEDICINE
Payer: COMMERCIAL

## 2023-01-16 ENCOUNTER — HOSPITAL ENCOUNTER (EMERGENCY)
Age: 38
Discharge: HOME | End: 2023-01-16
Attending: STUDENT IN AN ORGANIZED HEALTH CARE EDUCATION/TRAINING PROGRAM
Payer: COMMERCIAL

## 2023-01-16 VITALS
RESPIRATION RATE: 16 BRPM | TEMPERATURE: 97 F | DIASTOLIC BLOOD PRESSURE: 69 MMHG | SYSTOLIC BLOOD PRESSURE: 106 MMHG | OXYGEN SATURATION: 97 % | HEART RATE: 57 BPM

## 2023-01-16 DIAGNOSIS — R07.89 ATYPICAL CHEST PAIN: Primary | ICD-10-CM

## 2023-01-16 LAB
ALBUMIN SERPL-MCNC: 3.8 G/DL (ref 3.5–5)
ALBUMIN/GLOB SERPL: 1.1 (ref 1.1–2.2)
ALP SERPL-CCNC: 42 U/L (ref 45–117)
ALT SERPL-CCNC: 16 U/L (ref 12–78)
ANION GAP SERPL CALC-SCNC: 2 MMOL/L (ref 5–15)
AST SERPL-CCNC: 17 U/L (ref 15–37)
BASOPHILS # BLD: 0 K/UL (ref 0–0.1)
BASOPHILS NFR BLD: 1 % (ref 0–1)
BILIRUB SERPL-MCNC: 0.3 MG/DL (ref 0.2–1)
BUN SERPL-MCNC: 13 MG/DL (ref 6–20)
BUN/CREAT SERPL: 15 (ref 12–20)
CALCIUM SERPL-MCNC: 8.9 MG/DL (ref 8.5–10.1)
CHLORIDE SERPL-SCNC: 109 MMOL/L (ref 97–108)
CO2 SERPL-SCNC: 28 MMOL/L (ref 21–32)
COMMENT, HOLDF: NORMAL
CREAT SERPL-MCNC: 0.84 MG/DL (ref 0.55–1.02)
DIFFERENTIAL METHOD BLD: ABNORMAL
EOSINOPHIL # BLD: 0.1 K/UL (ref 0–0.4)
EOSINOPHIL NFR BLD: 2 % (ref 0–7)
ERYTHROCYTE [DISTWIDTH] IN BLOOD BY AUTOMATED COUNT: 12.1 % (ref 11.5–14.5)
GLOBULIN SER CALC-MCNC: 3.5 G/DL (ref 2–4)
GLUCOSE SERPL-MCNC: 116 MG/DL (ref 65–100)
HCT VFR BLD AUTO: 38 % (ref 35–47)
HGB BLD-MCNC: 12.2 G/DL (ref 11.5–16)
IMM GRANULOCYTES # BLD AUTO: 0 K/UL (ref 0–0.04)
IMM GRANULOCYTES NFR BLD AUTO: 0 % (ref 0–0.5)
LYMPHOCYTES # BLD: 1.3 K/UL (ref 0.8–3.5)
LYMPHOCYTES NFR BLD: 27 % (ref 12–49)
MCH RBC QN AUTO: 28.9 PG (ref 26–34)
MCHC RBC AUTO-ENTMCNC: 32.1 G/DL (ref 30–36.5)
MCV RBC AUTO: 90 FL (ref 80–99)
MONOCYTES # BLD: 0.3 K/UL (ref 0–1)
MONOCYTES NFR BLD: 5 % (ref 5–13)
NEUTS SEG # BLD: 3.2 K/UL (ref 1.8–8)
NEUTS SEG NFR BLD: 65 % (ref 32–75)
NRBC # BLD: 0 K/UL (ref 0–0.01)
NRBC BLD-RTO: 0 PER 100 WBC
PLATELET # BLD AUTO: 230 K/UL (ref 150–400)
PMV BLD AUTO: 8.7 FL (ref 8.9–12.9)
POTASSIUM SERPL-SCNC: 3.9 MMOL/L (ref 3.5–5.1)
PROT SERPL-MCNC: 7.3 G/DL (ref 6.4–8.2)
RBC # BLD AUTO: 4.22 M/UL (ref 3.8–5.2)
SAMPLES BEING HELD,HOLD: NORMAL
SODIUM SERPL-SCNC: 139 MMOL/L (ref 136–145)
TROPONIN-HIGH SENSITIVITY: 11 NG/L (ref 0–51)
WBC # BLD AUTO: 4.9 K/UL (ref 3.6–11)

## 2023-01-16 PROCEDURE — 36415 COLL VENOUS BLD VENIPUNCTURE: CPT

## 2023-01-16 PROCEDURE — 84484 ASSAY OF TROPONIN QUANT: CPT

## 2023-01-16 PROCEDURE — 71046 X-RAY EXAM CHEST 2 VIEWS: CPT

## 2023-01-16 PROCEDURE — 99284 EMERGENCY DEPT VISIT MOD MDM: CPT

## 2023-01-16 PROCEDURE — 85025 COMPLETE CBC W/AUTO DIFF WBC: CPT

## 2023-01-16 PROCEDURE — 80053 COMPREHEN METABOLIC PANEL: CPT

## 2023-01-16 NOTE — ED TRIAGE NOTES
Pt c/o left sided chest pain x 2 weeks, denies fever, denies cough, denies n/v, some sob , radiates to both arms

## 2023-01-16 NOTE — ED PROVIDER NOTES
Please note that this dictation was completed with [employer] the computer voice recognition software. Quite often unanticipated grammatical, syntax, homophones, and other interpretive errors are inadvertently transcribed by the computer software. Please disregard these errors. Please excuse any errors that have escaped final proofreading. Patient is a 27-year-old female with history of anxiety presenting to ED for evaluation of chest pain with onset 2 weeks ago. She states that the pain is fairly consistent and at times radiates to both of her arms. Denies known exacerbation or alleviation factors. She states that she spoke with her PCP about this several days ago who thought it may be more anxiety related who started her on Zoloft which she has been on for the past 4 days. She feels like her symptoms may be related to stress but wanted to get everything checked out today. She notes that several weeks ago she had a cold but recovered from this quickly and has no continued symptoms of that. Denies any personal history of coronary artery disease, DVT, PE. Denies any family history of heart disease at a young age. Denies hormone usage, recent long travel, recent surgery or immobilization. 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 diagnosed summer 2013 with 2 uteruses, 2 vaginas, and 2 cervixs. exploratory surgery last summer is when MD diagnosed and fixed vaginal and uterus septum. 2 cervixs still remain.     Other unknown and unspecified cause of morbidity or mortality     uterine anomoly, 2 cervixes and uterine septum, repaired    Other unknown and unspecified cause of morbidity or mortality     vaginal prolape approx 31 weeks    Psychiatric problem     anxiety    Vaso vagal episode        Past Surgical History:   Procedure Laterality Date    HX DILATION AND CURETTAGE  1/17/14    MAB    HX OTHER SURGICAL      laparoscopy and hystoscopy, D&C    HX OTHER SURGICAL  10/2013    vaginal septum and uterine septum removal (Dr. Genesis Rothman)    [place]  DELIVERY ONLY           Family History:   Problem Relation Age of Onset    Dementia Mother     Cancer Maternal Grandfather         prostate cancer    Dementia Paternal Grandmother        Social History     Socioeconomic History    Marital status:      Spouse name: Not on file    Number of children: Not on file    Years of education: Not on file    Highest education level: Not on file   Occupational History    Not on file   Tobacco Use    Smoking status: Never    Smokeless tobacco: Never   Substance and Sexual Activity    Alcohol use: No    Drug use: No    Sexual activity: Yes     Partners: Male     Birth control/protection: Pill   Other Topics Concern    Not on file   Social History Narrative    Not on file     Social Determinants of Health     Financial Resource Strain: Not on file   Food Insecurity: Not on file   Transportation Needs: Not on file   Physical Activity: Not on file   Stress: Not on file   Social Connections: Not on file   Intimate Partner Violence: Not on file   Housing Stability: Not on file         ALLERGIES: Patient has no known allergies. Review of Systems   Constitutional:  Negative for fever. HENT:  Negative for congestion and sore throat. Eyes:  Negative for visual disturbance. Respiratory:  Negative for cough and shortness of breath. Cardiovascular:  Positive for chest pain. Gastrointestinal:  Negative for abdominal pain, diarrhea, nausea and vomiting. Genitourinary:  Negative for dysuria. Musculoskeletal:  Negative for back pain. Skin:  Negative for color change. Neurological:  Negative for dizziness and headaches. Psychiatric/Behavioral:  Negative for confusion. Vitals:    23 1441   BP: 106/69   Pulse: (!) 57   Resp: 16   Temp: 97 °F (36.1 °C)   SpO2: 97%            Physical Exam  Vitals and nursing note reviewed.    Constitutional: General: She is not in acute distress. Appearance: Normal appearance. She is not ill-appearing. HENT:      Head: Normocephalic and atraumatic. Eyes:      General: Vision grossly intact. Extraocular Movements: Extraocular movements intact. Conjunctiva/sclera: Conjunctivae normal.   Neck:      Trachea: Phonation normal.   Cardiovascular:      Rate and Rhythm: Normal rate and regular rhythm. Heart sounds: Normal heart sounds. Pulmonary:      Effort: Pulmonary effort is normal.      Breath sounds: Normal breath sounds. Chest:      Chest wall: Tenderness present. Abdominal:      Palpations: Abdomen is soft. Tenderness: There is no abdominal tenderness. Musculoskeletal:         General: Normal range of motion. Cervical back: Normal range of motion. Skin:     General: Skin is warm and dry. Neurological:      General: No focal deficit present. Mental Status: She is alert and oriented to person, place, and time. Medical Decision Making  Patient is alert, afebrile, vital stable. Presents with 2 weeks of chest pain. PCP thought this was anxiety related and started her on Zoloft 4 days ago. Chest pain is reproduced on exam.  EKG without acute ischemic changes or ectopy. Troponin x1 within normal limits. Heart score 0. PERC negative. Remainder of labs and chest x-ray reassuring. Will advise continuing Zoloft and following up with her PCP, will also provide referral for cardiology for follow-up if symptoms continue. Discharged from ED in stable condition. Return precautions outlined. Amount and/or Complexity of Data Reviewed  Labs: ordered. Radiology: ordered. Decision-making details documented in ED Course. ECG/medicine tests: ordered. ED Course as of 01/16/23 1653   Mon Jan 16, 2023   [facility] EKG interpretation: [RP]   1612 ED EKG interpretation:4:12 PM  Rhythm: normal sinus rhythm; and regular.  Rate (approx.): 62; Axis: normal; P wave: normal; ST/T wave: no concerning ST elevations or depressions; Other findings: Sinus arrhythmia. EKG has also been evaluated by attending ED physician. Ty TROY Laguerre   [EP]   1534 XR CHEST PA LAT  IMPRESSION  No acute cardiopulmonary findings. [EP]      ED Course User Index  [EP] TROY Sánchez  [RP] Shelley Arenas MD        4:53 PM  Pt has been reevaluated. There are no new complaints, changes, or physical findings at this time. All results have been reviewed with patient and/or family. Medications have been reviewed w/ pt and/or family. Pt and/or family's questions have been answered. Pt and/or family expressed good understanding of the dx/tx/rx and is in agreement with plan of care. Pt instructed and agreed to f/u w/ PCP and to return to ED upon further deterioration. Return precautions outlined. All questions answered at this time. Pt is stable and ready for discharge. IMPRESSION:  1. Atypical chest pain        PLAN:  1. Current Discharge Medication List        2.    Follow-up Information       Follow up With Specialties Details Why Contact Info    Muriel Uriarte MD [facility] Medicine Schedule an appointment as soon as possible for a visit   [address] [facility] [phone] [facility]  Schedule an appointment as soon as possible for a visit   [address] [facility] [address] [facility] [address]              Return to ED if worse     Procedures

## 2025-01-10 ENCOUNTER — TELEPHONE (OUTPATIENT)
Age: 40
End: 2025-01-10

## 2025-01-10 NOTE — TELEPHONE ENCOUNTER
Left message to schedule a new patient apt per referral from Sahara Vargas NP with [facility]

## 2025-02-13 ENCOUNTER — HOSPITAL ENCOUNTER (OUTPATIENT)
Facility: HOSPITAL | Age: 40
Discharge: HOME | End: 2025-02-16
Payer: COMMERCIAL

## 2025-02-13 DIAGNOSIS — G44.52 NEW DAILY PERSISTENT HEADACHE: ICD-10-CM

## 2025-02-13 DIAGNOSIS — R41.89 BRAIN FOG: ICD-10-CM

## 2025-02-13 PROCEDURE — 70551 MRI BRAIN STEM W/O DYE: CPT

## 2025-05-12 ENCOUNTER — TELEPHONE (OUTPATIENT)
Age: 40
End: 2025-05-12

## 2025-05-12 NOTE — TELEPHONE ENCOUNTER
left message to r/s appt. please let  or Rowena know when she calls back.    Can put on 5/16 or the week of 6/23

## (undated) DEVICE — STERILE POLYISOPRENE POWDER-FREE SURGICAL GLOVES: Brand: PROTEXIS

## (undated) DEVICE — KENDALL SCD EXPRESS SLEEVES, KNEE LENGTH, MEDIUM: Brand: KENDALL SCD

## (undated) DEVICE — SUTURE VCRL SZ 2-0 L36IN ABSRB VLT L36MM CT-1 1/2 CIR J345H

## (undated) DEVICE — ROYAL SILK SURGICAL GOWN, XXL: Brand: CONVERTORS

## (undated) DEVICE — SPONGE: LAP 18X18 W  200/CS: Brand: MEDICAL ACTION INDUSTRIES

## (undated) DEVICE — STERILE POLYISOPRENE POWDER-FREE SURGICAL GLOVES WITH EMOLLIENT COATING: Brand: PROTEXIS

## (undated) DEVICE — REM POLYHESIVE ADULT PATIENT RETURN ELECTRODE: Brand: VALLEYLAB

## (undated) DEVICE — SOLIDIFIER MEDC 1200ML -- CONVERT TO 356117

## (undated) DEVICE — DRAPE FLD WRM W44XL66IN C6L FOR INTRATEMP SYS THERMABASIN

## (undated) DEVICE — SUTURE VCRL SZ 0 L36IN ABSRB VLT L40MM CT 1/2 CIR J358H

## (undated) DEVICE — AGENT HEMSTAT 3GM PURIFIED PLNT STARCH PWD ABSRB ARISTA AH

## (undated) DEVICE — COVERALL PREM SMS 2XL KNIT --

## (undated) DEVICE — 3000CC GUARDIAN II: Brand: GUARDIAN

## (undated) DEVICE — DEVON™ KNEE AND BODY STRAP 60" X 3" (1.5 M X 7.6 CM): Brand: DEVON

## (undated) DEVICE — POOLE SUCTION INSTRUMENT WITH REMOVABLE SHEATH: Brand: POOLE

## (undated) DEVICE — ROYALSILK SURGICAL GOWN, L: Brand: CONVERTORS

## (undated) DEVICE — PACK PROCEDURE SURG C SECT KT SMH

## (undated) DEVICE — TRAY SPNL 24GA ANES

## (undated) DEVICE — (D)PREP SKN CHLRAPRP APPL 26ML -- CONVERT TO ITEM 371833

## (undated) DEVICE — SOLUTION IRRIG 1000ML H2O STRL BLT

## (undated) DEVICE — SUTURE MCRYL SZ 4-0 L27IN ABSRB UD L24MM PS-1 3/8 CIR PRIM Y935H

## (undated) DEVICE — LIGHT HANDLE: Brand: DEVON

## (undated) DEVICE — SUTURE MCRYL SZ 1 L36IN ABSRB VLT CT-1 L36MM 1/2 CIR TAPR Y347H

## (undated) DEVICE — SOLUTION IV 1000ML 0.9% SOD CHL